# Patient Record
Sex: MALE | Race: WHITE | NOT HISPANIC OR LATINO | Employment: OTHER | URBAN - METROPOLITAN AREA
[De-identification: names, ages, dates, MRNs, and addresses within clinical notes are randomized per-mention and may not be internally consistent; named-entity substitution may affect disease eponyms.]

---

## 2023-11-15 ENCOUNTER — LAB (OUTPATIENT)
Dept: LAB | Facility: HOSPITAL | Age: 28
End: 2023-11-15
Payer: COMMERCIAL

## 2023-11-15 ENCOUNTER — TRANSCRIBE ORDERS (OUTPATIENT)
Dept: LAB | Facility: HOSPITAL | Age: 28
End: 2023-11-15
Payer: COMMERCIAL

## 2023-11-15 DIAGNOSIS — M25.561 RIGHT KNEE PAIN, UNSPECIFIED CHRONICITY: ICD-10-CM

## 2023-11-15 DIAGNOSIS — M25.561 RIGHT KNEE PAIN, UNSPECIFIED CHRONICITY: Primary | ICD-10-CM

## 2023-11-15 LAB
APPEARANCE FLD: ABNORMAL
CHROMATIN AB SERPL-ACNC: 15.8 IU/ML (ref 0–14)
COLOR FLD: YELLOW
CRYSTALS FLD MICRO: NORMAL
LYMPHOCYTES NFR FLD MANUAL: 6 %
METHOD: ABNORMAL
MONOS+MACROS NFR FLD: 13 %
NEUTROPHILS NFR FLD MANUAL: 81 %
NUC CELL # FLD: ABNORMAL /MM3
RBC # FLD AUTO: 2000 /MM3

## 2023-11-15 PROCEDURE — 89060 EXAM SYNOVIAL FLUID CRYSTALS: CPT

## 2023-11-15 PROCEDURE — 87205 SMEAR GRAM STAIN: CPT | Performed by: ORTHOPAEDIC SURGERY

## 2023-11-15 PROCEDURE — 87070 CULTURE OTHR SPECIMN AEROBIC: CPT | Performed by: ORTHOPAEDIC SURGERY

## 2023-11-15 PROCEDURE — 87147 CULTURE TYPE IMMUNOLOGIC: CPT | Performed by: ORTHOPAEDIC SURGERY

## 2023-11-15 PROCEDURE — 86431 RHEUMATOID FACTOR QUANT: CPT

## 2023-11-15 PROCEDURE — 87186 SC STD MICRODIL/AGAR DIL: CPT | Performed by: ORTHOPAEDIC SURGERY

## 2023-11-15 PROCEDURE — 36415 COLL VENOUS BLD VENIPUNCTURE: CPT

## 2023-11-15 PROCEDURE — 87015 SPECIMEN INFECT AGNT CONCNTJ: CPT | Performed by: ORTHOPAEDIC SURGERY

## 2023-11-15 PROCEDURE — 89051 BODY FLUID CELL COUNT: CPT | Performed by: ORTHOPAEDIC SURGERY

## 2023-11-17 LAB
BACTERIA FLD CULT: ABNORMAL
GRAM STN SPEC: ABNORMAL
GRAM STN SPEC: ABNORMAL

## 2023-11-20 ENCOUNTER — LAB (OUTPATIENT)
Dept: LAB | Facility: HOSPITAL | Age: 28
End: 2023-11-20
Payer: COMMERCIAL

## 2023-11-20 ENCOUNTER — TRANSCRIBE ORDERS (OUTPATIENT)
Dept: LAB | Facility: HOSPITAL | Age: 28
End: 2023-11-20
Payer: COMMERCIAL

## 2023-11-20 DIAGNOSIS — M25.562 LEFT KNEE PAIN, UNSPECIFIED CHRONICITY: ICD-10-CM

## 2023-11-20 DIAGNOSIS — M25.461 EFFUSION OF RIGHT KNEE: ICD-10-CM

## 2023-11-20 DIAGNOSIS — M25.562 LEFT KNEE PAIN, UNSPECIFIED CHRONICITY: Primary | ICD-10-CM

## 2023-11-20 DIAGNOSIS — M25.461 EFFUSION OF RIGHT KNEE: Primary | ICD-10-CM

## 2023-11-20 LAB
ALBUMIN SERPL-MCNC: 4 G/DL (ref 3.5–5.2)
ALBUMIN/GLOB SERPL: 1 G/DL
ALP SERPL-CCNC: 149 U/L (ref 39–117)
ALT SERPL W P-5'-P-CCNC: 102 U/L (ref 1–41)
ANION GAP SERPL CALCULATED.3IONS-SCNC: 12.2 MMOL/L (ref 5–15)
APPEARANCE FLD: ABNORMAL
AST SERPL-CCNC: 34 U/L (ref 1–40)
BASOPHILS # BLD AUTO: 0.06 10*3/MM3 (ref 0–0.2)
BASOPHILS NFR BLD AUTO: 0.7 % (ref 0–1.5)
BILIRUB SERPL-MCNC: 0.5 MG/DL (ref 0–1.2)
BUN SERPL-MCNC: 13 MG/DL (ref 6–20)
BUN/CREAT SERPL: 14.1 (ref 7–25)
CALCIUM SPEC-SCNC: 9.8 MG/DL (ref 8.6–10.5)
CHLORIDE SERPL-SCNC: 100 MMOL/L (ref 98–107)
CO2 SERPL-SCNC: 24.8 MMOL/L (ref 22–29)
COLOR FLD: YELLOW
CREAT SERPL-MCNC: 0.92 MG/DL (ref 0.76–1.27)
CRP SERPL-MCNC: 10.42 MG/DL (ref 0–0.5)
CRYSTALS FLD MICRO: NORMAL
DEPRECATED RDW RBC AUTO: 35.8 FL (ref 37–54)
EGFRCR SERPLBLD CKD-EPI 2021: 116.2 ML/MIN/1.73
EOSINOPHIL # BLD AUTO: 0.12 10*3/MM3 (ref 0–0.4)
EOSINOPHIL NFR BLD AUTO: 1.4 % (ref 0.3–6.2)
ERYTHROCYTE [DISTWIDTH] IN BLOOD BY AUTOMATED COUNT: 12.1 % (ref 12.3–15.4)
ERYTHROCYTE [SEDIMENTATION RATE] IN BLOOD: 33 MM/HR (ref 0–15)
GLOBULIN UR ELPH-MCNC: 3.9 GM/DL
GLUCOSE SERPL-MCNC: 108 MG/DL (ref 65–99)
HCT VFR BLD AUTO: 40.7 % (ref 37.5–51)
HGB BLD-MCNC: 13.5 G/DL (ref 13–17.7)
IMM GRANULOCYTES # BLD AUTO: 0.06 10*3/MM3 (ref 0–0.05)
IMM GRANULOCYTES NFR BLD AUTO: 0.7 % (ref 0–0.5)
LYMPHOCYTES # BLD AUTO: 1.45 10*3/MM3 (ref 0.7–3.1)
LYMPHOCYTES NFR BLD AUTO: 16.7 % (ref 19.6–45.3)
LYMPHOCYTES NFR FLD MANUAL: 5 %
MCH RBC QN AUTO: 27.2 PG (ref 26.6–33)
MCHC RBC AUTO-ENTMCNC: 33.2 G/DL (ref 31.5–35.7)
MCV RBC AUTO: 81.9 FL (ref 79–97)
METHOD: ABNORMAL
MONOCYTES # BLD AUTO: 0.94 10*3/MM3 (ref 0.1–0.9)
MONOCYTES NFR BLD AUTO: 10.8 % (ref 5–12)
MONOS+MACROS NFR FLD: 9 %
NEUTROPHILS NFR BLD AUTO: 6.06 10*3/MM3 (ref 1.7–7)
NEUTROPHILS NFR BLD AUTO: 69.7 % (ref 42.7–76)
NEUTROPHILS NFR FLD MANUAL: 86 %
NRBC BLD AUTO-RTO: 0 /100 WBC (ref 0–0.2)
NRBC FLD-RTO: 1 /100 WBCS
NUC CELL # FLD: ABNORMAL /MM3
PLATELET # BLD AUTO: 347 10*3/MM3 (ref 140–450)
PMV BLD AUTO: 9.2 FL (ref 6–12)
POTASSIUM SERPL-SCNC: 4.3 MMOL/L (ref 3.5–5.2)
PROT SERPL-MCNC: 7.9 G/DL (ref 6–8.5)
RBC # BLD AUTO: 4.97 10*6/MM3 (ref 4.14–5.8)
RBC # FLD AUTO: 4000 /MM3
SODIUM SERPL-SCNC: 137 MMOL/L (ref 136–145)
WBC NRBC COR # BLD AUTO: 8.69 10*3/MM3 (ref 3.4–10.8)

## 2023-11-20 PROCEDURE — 85025 COMPLETE CBC W/AUTO DIFF WBC: CPT

## 2023-11-20 PROCEDURE — 87070 CULTURE OTHR SPECIMN AEROBIC: CPT | Performed by: ORTHOPAEDIC SURGERY

## 2023-11-20 PROCEDURE — 86038 ANTINUCLEAR ANTIBODIES: CPT

## 2023-11-20 PROCEDURE — 89060 EXAM SYNOVIAL FLUID CRYSTALS: CPT

## 2023-11-20 PROCEDURE — 86140 C-REACTIVE PROTEIN: CPT

## 2023-11-20 PROCEDURE — 36415 COLL VENOUS BLD VENIPUNCTURE: CPT

## 2023-11-20 PROCEDURE — 80053 COMPREHEN METABOLIC PANEL: CPT

## 2023-11-20 PROCEDURE — 85652 RBC SED RATE AUTOMATED: CPT

## 2023-11-20 PROCEDURE — 89051 BODY FLUID CELL COUNT: CPT | Performed by: ORTHOPAEDIC SURGERY

## 2023-11-20 PROCEDURE — 87205 SMEAR GRAM STAIN: CPT | Performed by: ORTHOPAEDIC SURGERY

## 2023-11-20 RX ORDER — IBUPROFEN 200 MG
200 TABLET ORAL EVERY 6 HOURS PRN
COMMUNITY

## 2023-11-20 NOTE — H&P
HPI  Chief complaint bilateral knee swelling  History present illness: This 28-year-old right-hand-dominant  male works as an  and presents with intermittent swelling of both knees that started about a year ago. It initially started after a long car ride and he has had waxing and waning symptoms ever since. He gets stiffness, soreness, weakness and swelling in the right knee is more affected than the left. Pain with activity is primarily with flexion bending movements. He is use Advil, ice and heat to help control symptoms along with a neoprene sleeve. He has no significant pain with walking. He does have decreased range of motion.  Physical Exam  Right knee:  Skin is normal. Normal alignment.  There is quad atrophy. There is a 3+ effusion. No warmth. No erythema. Normal sensation. Capillary refill is normal.  Range of motion of the knee is 0 to 105 degrees of flexion.  Moderate patella crepitation. The patellofemoral grind test is positive.  Lachman grade 0. Posterior drawer grade 0. Posterior lateral drawer grade 0. Pivot shift grade 0. Valgus grade 0. Varus grade 0.    Left knee:  Skin is normal. Normal alignment.  There is quad atrophy. There is 2+ effusion. No warmth. No erythema. Normal sensation. Capillary refill is normal.  Range of motion of the knee is 0 to 115 degrees of flexion.  Moderate patella crepitation. The patellofemoral grind test is positive.  Lachman grade 0. Posterior drawer grade 0. Posterior lateral drawer grade 0. Pivot shift grade 0. Valgus grade 0. Varus grade 0.  Assessment / Plan  Three-view x-ray bilateral knees shows normal osseous quality and joint space without significant abnormality    Assessment: Patient has recurrent effusions bilateral knees etiology unknown.    1. Effusion of right knee joint  M25.461: Effusion, right knee  XR, KNEE, 3 VIEW    2. Effusion of joint of left knee  M25.462: Effusion, left knee    XR, KNEE, 3 VIEW  Result:  - XRAY KNEE 3  VIEW: Performed  Patient Instructions  Cultures of the right knee did come back positive for MRSA.  We will plan for right knee irrigation and debridement with postop infectious disease consult.  We will also order blood work.

## 2023-11-21 ENCOUNTER — HOSPITAL ENCOUNTER (INPATIENT)
Facility: HOSPITAL | Age: 28
LOS: 1 days | Discharge: HOME | End: 2023-11-22
Attending: ORTHOPAEDIC SURGERY | Admitting: ORTHOPAEDIC SURGERY
Payer: COMMERCIAL

## 2023-11-21 ENCOUNTER — ANESTHESIA EVENT (OUTPATIENT)
Dept: PERIOP | Facility: HOSPITAL | Age: 28
End: 2023-11-21
Payer: COMMERCIAL

## 2023-11-21 ENCOUNTER — ANESTHESIA (OUTPATIENT)
Dept: PERIOP | Facility: HOSPITAL | Age: 28
End: 2023-11-21
Payer: COMMERCIAL

## 2023-11-21 DIAGNOSIS — M00.061 STAPHYLOCOCCAL ARTHRITIS OF RIGHT KNEE: Primary | ICD-10-CM

## 2023-11-21 PROBLEM — M00.9 SEPTIC JOINT OF RIGHT KNEE JOINT: Status: ACTIVE | Noted: 2023-11-21

## 2023-11-21 LAB
ANA SER QL IF: POSITIVE
ANA SPECKLED TITR SER: ABNORMAL {TITER}
Lab: ABNORMAL

## 2023-11-21 PROCEDURE — 25010000002 HYDROMORPHONE 1 MG/ML SOLUTION: Performed by: ANESTHESIOLOGY

## 2023-11-21 PROCEDURE — 0SBC4ZZ EXCISION OF RIGHT KNEE JOINT, PERCUTANEOUS ENDOSCOPIC APPROACH: ICD-10-PCS | Performed by: ORTHOPAEDIC SURGERY

## 2023-11-21 PROCEDURE — 25010000002 FENTANYL CITRATE (PF) 50 MCG/ML SOLUTION: Performed by: ANESTHESIOLOGY

## 2023-11-21 PROCEDURE — 25010000002 ONDANSETRON PER 1 MG: Performed by: ANESTHESIOLOGY

## 2023-11-21 PROCEDURE — 25010000002 PROPOFOL 200 MG/20ML EMULSION: Performed by: ANESTHESIOLOGY

## 2023-11-21 PROCEDURE — 25010000002 MIDAZOLAM PER 1 MG: Performed by: ANESTHESIOLOGY

## 2023-11-21 PROCEDURE — 25010000002 KETOROLAC TROMETHAMINE PER 15 MG: Performed by: ANESTHESIOLOGY

## 2023-11-21 PROCEDURE — 25810000003 SODIUM CHLORIDE 0.9 % SOLUTION: Performed by: ORTHOPAEDIC SURGERY

## 2023-11-21 PROCEDURE — 25010000002 VANCOMYCIN 10 G RECONSTITUTED SOLUTION: Performed by: ORTHOPAEDIC SURGERY

## 2023-11-21 PROCEDURE — 25010000002 DEXAMETHASONE SODIUM PHOSPHATE 20 MG/5ML SOLUTION: Performed by: ANESTHESIOLOGY

## 2023-11-21 PROCEDURE — 0SBD4ZZ EXCISION OF LEFT KNEE JOINT, PERCUTANEOUS ENDOSCOPIC APPROACH: ICD-10-PCS | Performed by: ORTHOPAEDIC SURGERY

## 2023-11-21 PROCEDURE — 25810000003 LACTATED RINGERS PER 1000 ML: Performed by: ANESTHESIOLOGY

## 2023-11-21 RX ORDER — SODIUM CHLORIDE, SODIUM LACTATE, POTASSIUM CHLORIDE, CALCIUM CHLORIDE 600; 310; 30; 20 MG/100ML; MG/100ML; MG/100ML; MG/100ML
9 INJECTION, SOLUTION INTRAVENOUS CONTINUOUS
Status: DISCONTINUED | OUTPATIENT
Start: 2023-11-21 | End: 2023-11-22 | Stop reason: HOSPADM

## 2023-11-21 RX ORDER — FENTANYL CITRATE 50 UG/ML
50 INJECTION, SOLUTION INTRAMUSCULAR; INTRAVENOUS
Status: DISCONTINUED | OUTPATIENT
Start: 2023-11-21 | End: 2023-11-21 | Stop reason: HOSPADM

## 2023-11-21 RX ORDER — FLUMAZENIL 0.1 MG/ML
0.2 INJECTION INTRAVENOUS AS NEEDED
Status: DISCONTINUED | OUTPATIENT
Start: 2023-11-21 | End: 2023-11-21 | Stop reason: HOSPADM

## 2023-11-21 RX ORDER — HYDROMORPHONE HYDROCHLORIDE 1 MG/ML
0.5 INJECTION, SOLUTION INTRAMUSCULAR; INTRAVENOUS; SUBCUTANEOUS
Status: DISCONTINUED | OUTPATIENT
Start: 2023-11-21 | End: 2023-11-22 | Stop reason: HOSPADM

## 2023-11-21 RX ORDER — LIDOCAINE HYDROCHLORIDE 10 MG/ML
0.5 INJECTION, SOLUTION INFILTRATION; PERINEURAL ONCE AS NEEDED
Status: DISCONTINUED | OUTPATIENT
Start: 2023-11-21 | End: 2023-11-21 | Stop reason: HOSPADM

## 2023-11-21 RX ORDER — PROPOFOL 10 MG/ML
INJECTION, EMULSION INTRAVENOUS AS NEEDED
Status: DISCONTINUED | OUTPATIENT
Start: 2023-11-21 | End: 2023-11-21 | Stop reason: SURG

## 2023-11-21 RX ORDER — EPHEDRINE SULFATE 50 MG/ML
5 INJECTION, SOLUTION INTRAVENOUS ONCE AS NEEDED
Status: DISCONTINUED | OUTPATIENT
Start: 2023-11-21 | End: 2023-11-21 | Stop reason: HOSPADM

## 2023-11-21 RX ORDER — ONDANSETRON 2 MG/ML
4 INJECTION INTRAMUSCULAR; INTRAVENOUS EVERY 6 HOURS PRN
Status: DISCONTINUED | OUTPATIENT
Start: 2023-11-21 | End: 2023-11-22 | Stop reason: HOSPADM

## 2023-11-21 RX ORDER — SODIUM CHLORIDE, SODIUM LACTATE, POTASSIUM CHLORIDE, CALCIUM CHLORIDE 600; 310; 30; 20 MG/100ML; MG/100ML; MG/100ML; MG/100ML
INJECTION, SOLUTION INTRAVENOUS CONTINUOUS PRN
Status: DISCONTINUED | OUTPATIENT
Start: 2023-11-21 | End: 2023-11-21 | Stop reason: SURG

## 2023-11-21 RX ORDER — ACETAMINOPHEN 500 MG
1000 TABLET ORAL ONCE
Status: COMPLETED | OUTPATIENT
Start: 2023-11-21 | End: 2023-11-21

## 2023-11-21 RX ORDER — VANCOMYCIN/0.9 % SOD CHLORIDE 1.5G/250ML
1500 PLASTIC BAG, INJECTION (ML) INTRAVENOUS ONCE
Status: COMPLETED | OUTPATIENT
Start: 2023-11-21 | End: 2023-11-21

## 2023-11-21 RX ORDER — PROMETHAZINE HYDROCHLORIDE 25 MG/1
25 TABLET ORAL ONCE AS NEEDED
Status: DISCONTINUED | OUTPATIENT
Start: 2023-11-21 | End: 2023-11-21 | Stop reason: HOSPADM

## 2023-11-21 RX ORDER — FENTANYL CITRATE 50 UG/ML
INJECTION, SOLUTION INTRAMUSCULAR; INTRAVENOUS AS NEEDED
Status: DISCONTINUED | OUTPATIENT
Start: 2023-11-21 | End: 2023-11-21 | Stop reason: SURG

## 2023-11-21 RX ORDER — VANCOMYCIN HYDROCHLORIDE 1 G/20ML
1500 INJECTION, POWDER, LYOPHILIZED, FOR SOLUTION INTRAVENOUS ONCE
Status: DISCONTINUED | OUTPATIENT
Start: 2023-11-21 | End: 2023-11-21

## 2023-11-21 RX ORDER — KETOROLAC TROMETHAMINE 30 MG/ML
INJECTION, SOLUTION INTRAMUSCULAR; INTRAVENOUS AS NEEDED
Status: DISCONTINUED | OUTPATIENT
Start: 2023-11-21 | End: 2023-11-21 | Stop reason: SURG

## 2023-11-21 RX ORDER — DEXMEDETOMIDINE HYDROCHLORIDE 100 UG/ML
INJECTION, SOLUTION INTRAVENOUS AS NEEDED
Status: DISCONTINUED | OUTPATIENT
Start: 2023-11-21 | End: 2023-11-21

## 2023-11-21 RX ORDER — HYDRALAZINE HYDROCHLORIDE 20 MG/ML
5 INJECTION INTRAMUSCULAR; INTRAVENOUS
Status: DISCONTINUED | OUTPATIENT
Start: 2023-11-21 | End: 2023-11-21 | Stop reason: HOSPADM

## 2023-11-21 RX ORDER — PROMETHAZINE HYDROCHLORIDE 25 MG/1
25 SUPPOSITORY RECTAL ONCE AS NEEDED
Status: DISCONTINUED | OUTPATIENT
Start: 2023-11-21 | End: 2023-11-21 | Stop reason: HOSPADM

## 2023-11-21 RX ORDER — DROPERIDOL 2.5 MG/ML
0.62 INJECTION, SOLUTION INTRAMUSCULAR; INTRAVENOUS
Status: DISCONTINUED | OUTPATIENT
Start: 2023-11-21 | End: 2023-11-21 | Stop reason: HOSPADM

## 2023-11-21 RX ORDER — ASPIRIN 81 MG/1
81 TABLET ORAL DAILY
Status: DISCONTINUED | OUTPATIENT
Start: 2023-11-22 | End: 2023-11-22 | Stop reason: HOSPADM

## 2023-11-21 RX ORDER — DEXAMETHASONE SODIUM PHOSPHATE 4 MG/ML
INJECTION, SOLUTION INTRA-ARTICULAR; INTRALESIONAL; INTRAMUSCULAR; INTRAVENOUS; SOFT TISSUE AS NEEDED
Status: DISCONTINUED | OUTPATIENT
Start: 2023-11-21 | End: 2023-11-21 | Stop reason: SURG

## 2023-11-21 RX ORDER — IPRATROPIUM BROMIDE AND ALBUTEROL SULFATE 2.5; .5 MG/3ML; MG/3ML
3 SOLUTION RESPIRATORY (INHALATION) ONCE AS NEEDED
Status: DISCONTINUED | OUTPATIENT
Start: 2023-11-21 | End: 2023-11-21 | Stop reason: HOSPADM

## 2023-11-21 RX ORDER — HYDROCODONE BITARTRATE AND ACETAMINOPHEN 10; 325 MG/1; MG/1
1 TABLET ORAL EVERY 4 HOURS PRN
Status: DISCONTINUED | OUTPATIENT
Start: 2023-11-21 | End: 2023-11-22 | Stop reason: HOSPADM

## 2023-11-21 RX ORDER — MIDAZOLAM HYDROCHLORIDE 1 MG/ML
1 INJECTION INTRAMUSCULAR; INTRAVENOUS
Status: COMPLETED | OUTPATIENT
Start: 2023-11-21 | End: 2023-11-21

## 2023-11-21 RX ORDER — OXYCODONE HYDROCHLORIDE AND ACETAMINOPHEN 5; 325 MG/1; MG/1
1 TABLET ORAL EVERY 4 HOURS PRN
Status: DISCONTINUED | OUTPATIENT
Start: 2023-11-21 | End: 2023-11-22 | Stop reason: HOSPADM

## 2023-11-21 RX ORDER — NALOXONE HCL 0.4 MG/ML
0.4 VIAL (ML) INJECTION
Status: DISCONTINUED | OUTPATIENT
Start: 2023-11-21 | End: 2023-11-22 | Stop reason: HOSPADM

## 2023-11-21 RX ORDER — HYDROMORPHONE HYDROCHLORIDE 1 MG/ML
0.5 INJECTION, SOLUTION INTRAMUSCULAR; INTRAVENOUS; SUBCUTANEOUS
Status: DISCONTINUED | OUTPATIENT
Start: 2023-11-21 | End: 2023-11-21 | Stop reason: HOSPADM

## 2023-11-21 RX ORDER — SODIUM CHLORIDE 0.9 % (FLUSH) 0.9 %
3 SYRINGE (ML) INJECTION EVERY 12 HOURS SCHEDULED
Status: DISCONTINUED | OUTPATIENT
Start: 2023-11-21 | End: 2023-11-21 | Stop reason: HOSPADM

## 2023-11-21 RX ORDER — LIDOCAINE HYDROCHLORIDE 20 MG/ML
INJECTION, SOLUTION EPIDURAL; INFILTRATION; INTRACAUDAL; PERINEURAL AS NEEDED
Status: DISCONTINUED | OUTPATIENT
Start: 2023-11-21 | End: 2023-11-21 | Stop reason: SURG

## 2023-11-21 RX ORDER — LABETALOL HYDROCHLORIDE 5 MG/ML
5 INJECTION, SOLUTION INTRAVENOUS
Status: DISCONTINUED | OUTPATIENT
Start: 2023-11-21 | End: 2023-11-21 | Stop reason: HOSPADM

## 2023-11-21 RX ORDER — ONDANSETRON 2 MG/ML
INJECTION INTRAMUSCULAR; INTRAVENOUS AS NEEDED
Status: DISCONTINUED | OUTPATIENT
Start: 2023-11-21 | End: 2023-11-21 | Stop reason: SURG

## 2023-11-21 RX ORDER — HYDROCODONE BITARTRATE AND ACETAMINOPHEN 5; 325 MG/1; MG/1
1 TABLET ORAL ONCE AS NEEDED
Status: DISCONTINUED | OUTPATIENT
Start: 2023-11-21 | End: 2023-11-21 | Stop reason: HOSPADM

## 2023-11-21 RX ORDER — ONDANSETRON 2 MG/ML
4 INJECTION INTRAMUSCULAR; INTRAVENOUS ONCE AS NEEDED
Status: DISCONTINUED | OUTPATIENT
Start: 2023-11-21 | End: 2023-11-21 | Stop reason: HOSPADM

## 2023-11-21 RX ORDER — BUPIVACAINE HYDROCHLORIDE AND EPINEPHRINE 5; 5 MG/ML; UG/ML
INJECTION, SOLUTION EPIDURAL; INTRACAUDAL; PERINEURAL AS NEEDED
Status: DISCONTINUED | OUTPATIENT
Start: 2023-11-21 | End: 2023-11-21 | Stop reason: HOSPADM

## 2023-11-21 RX ORDER — NALOXONE HCL 0.4 MG/ML
0.2 VIAL (ML) INJECTION AS NEEDED
Status: DISCONTINUED | OUTPATIENT
Start: 2023-11-21 | End: 2023-11-21 | Stop reason: HOSPADM

## 2023-11-21 RX ORDER — DEXMEDETOMIDINE HYDROCHLORIDE 100 UG/ML
INJECTION, SOLUTION INTRAVENOUS AS NEEDED
Status: DISCONTINUED | OUTPATIENT
Start: 2023-11-21 | End: 2023-11-21 | Stop reason: SURG

## 2023-11-21 RX ORDER — OXYCODONE AND ACETAMINOPHEN 7.5; 325 MG/1; MG/1
1 TABLET ORAL EVERY 4 HOURS PRN
Status: DISCONTINUED | OUTPATIENT
Start: 2023-11-21 | End: 2023-11-21 | Stop reason: HOSPADM

## 2023-11-21 RX ORDER — SODIUM CHLORIDE, SODIUM LACTATE, POTASSIUM CHLORIDE, AND CALCIUM CHLORIDE .6; .31; .03; .02 G/100ML; G/100ML; G/100ML; G/100ML
IRRIGANT IRRIGATION AS NEEDED
Status: DISCONTINUED | OUTPATIENT
Start: 2023-11-21 | End: 2023-11-21 | Stop reason: HOSPADM

## 2023-11-21 RX ORDER — VANCOMYCIN/0.9 % SOD CHLORIDE 1.5G/250ML
15 PLASTIC BAG, INJECTION (ML) INTRAVENOUS EVERY 12 HOURS
Status: DISCONTINUED | OUTPATIENT
Start: 2023-11-22 | End: 2023-11-21

## 2023-11-21 RX ORDER — SODIUM CHLORIDE 0.9 % (FLUSH) 0.9 %
3-10 SYRINGE (ML) INJECTION AS NEEDED
Status: DISCONTINUED | OUTPATIENT
Start: 2023-11-21 | End: 2023-11-21 | Stop reason: HOSPADM

## 2023-11-21 RX ORDER — DIPHENHYDRAMINE HYDROCHLORIDE 50 MG/ML
12.5 INJECTION INTRAMUSCULAR; INTRAVENOUS
Status: DISCONTINUED | OUTPATIENT
Start: 2023-11-21 | End: 2023-11-21 | Stop reason: HOSPADM

## 2023-11-21 RX ADMIN — ACETAMINOPHEN 1000 MG: 500 TABLET ORAL at 15:49

## 2023-11-21 RX ADMIN — FENTANYL CITRATE 50 MCG: 50 INJECTION, SOLUTION INTRAMUSCULAR; INTRAVENOUS at 17:17

## 2023-11-21 RX ADMIN — ONDANSETRON 4 MG: 2 INJECTION INTRAMUSCULAR; INTRAVENOUS at 18:10

## 2023-11-21 RX ADMIN — LIDOCAINE HYDROCHLORIDE 100 MG: 20 INJECTION, SOLUTION EPIDURAL; INFILTRATION; INTRACAUDAL; PERINEURAL at 17:11

## 2023-11-21 RX ADMIN — PROPOFOL 50 MG: 10 INJECTION, EMULSION INTRAVENOUS at 17:13

## 2023-11-21 RX ADMIN — DEXAMETHASONE SODIUM PHOSPHATE 10 MG: 4 INJECTION, SOLUTION INTRAMUSCULAR; INTRAVENOUS at 17:25

## 2023-11-21 RX ADMIN — OXYCODONE AND ACETAMINOPHEN 1 TABLET: 7.5; 325 TABLET ORAL at 18:57

## 2023-11-21 RX ADMIN — HYDROMORPHONE HYDROCHLORIDE 0.5 MG: 1 INJECTION, SOLUTION INTRAMUSCULAR; INTRAVENOUS; SUBCUTANEOUS at 17:54

## 2023-11-21 RX ADMIN — SODIUM CHLORIDE, POTASSIUM CHLORIDE, SODIUM LACTATE AND CALCIUM CHLORIDE: 600; 310; 30; 20 INJECTION, SOLUTION INTRAVENOUS at 17:05

## 2023-11-21 RX ADMIN — MIDAZOLAM 1 MG: 1 INJECTION INTRAMUSCULAR; INTRAVENOUS at 16:04

## 2023-11-21 RX ADMIN — DEXMEDETOMIDINE HCL 30 MCG: 100 INJECTION INTRAVENOUS at 17:25

## 2023-11-21 RX ADMIN — KETOROLAC TROMETHAMINE 30 MG: 30 INJECTION, SOLUTION INTRAMUSCULAR at 18:10

## 2023-11-21 RX ADMIN — PROPOFOL 250 MG: 10 INJECTION, EMULSION INTRAVENOUS at 17:11

## 2023-11-21 RX ADMIN — VANCOMYCIN HYDROCHLORIDE 1500 MG: 10 INJECTION, POWDER, LYOPHILIZED, FOR SOLUTION INTRAVENOUS at 15:46

## 2023-11-21 RX ADMIN — MIDAZOLAM 1 MG: 1 INJECTION INTRAMUSCULAR; INTRAVENOUS at 15:54

## 2023-11-21 NOTE — ANESTHESIA PREPROCEDURE EVALUATION
Anesthesia Evaluation     Patient summary reviewed and Nursing notes reviewed   NPO Solid Status: > 6 hours  NPO Liquid Status: > 2 hours           Airway   Mallampati: I  TM distance: >3 FB  Neck ROM: full  Dental - normal exam     Pulmonary    (-) COPD, asthma, not a smoker  Cardiovascular   Exercise tolerance: good (4-7 METS)    (-) past MI, dysrhythmias, angina      Neuro/Psych  (-) seizures, CVA  GI/Hepatic/Renal/Endo    (-) GERD, liver disease, no renal disease, diabetes, no thyroid disorder    Musculoskeletal     Abdominal    Substance History      OB/GYN          Other                    Anesthesia Plan    ASA 1     general       Anesthetic plan, risks, benefits, and alternatives have been provided, discussed and informed consent has been obtained with: patient.    CODE STATUS:

## 2023-11-21 NOTE — ANESTHESIA PROCEDURE NOTES
Airway  Urgency: elective    Date/Time: 11/21/2023 5:14 PM  Airway not difficult    General Information and Staff    Patient location during procedure: OR    Indications and Patient Condition  Indications for airway management: airway protection  MILS maintained throughout  Mask difficulty assessment: 0 - not attempted    Final Airway Details  Final airway type: supraglottic airway      Successful airway: unique  Size 4     Number of attempts at approach: 1  Assessment: lips, teeth, and gum same as pre-op and atraumatic intubation

## 2023-11-22 VITALS
SYSTOLIC BLOOD PRESSURE: 121 MMHG | RESPIRATION RATE: 16 BRPM | WEIGHT: 210.1 LBS | HEART RATE: 80 BPM | BODY MASS INDEX: 29.41 KG/M2 | TEMPERATURE: 97.8 F | HEIGHT: 71 IN | OXYGEN SATURATION: 100 % | DIASTOLIC BLOOD PRESSURE: 71 MMHG

## 2023-11-22 PROCEDURE — 25010000002 VANCOMYCIN 10 G RECONSTITUTED SOLUTION: Performed by: ORTHOPAEDIC SURGERY

## 2023-11-22 PROCEDURE — 25010000002 CEFTRIAXONE PER 250 MG: Performed by: ORTHOPAEDIC SURGERY

## 2023-11-22 PROCEDURE — C1751 CATH, INF, PER/CENT/MIDLINE: HCPCS

## 2023-11-22 PROCEDURE — 87040 BLOOD CULTURE FOR BACTERIA: CPT | Performed by: INTERNAL MEDICINE

## 2023-11-22 PROCEDURE — 02HV33Z INSERTION OF INFUSION DEVICE INTO SUPERIOR VENA CAVA, PERCUTANEOUS APPROACH: ICD-10-PCS | Performed by: ORTHOPAEDIC SURGERY

## 2023-11-22 PROCEDURE — 25810000003 SODIUM CHLORIDE 0.9 % SOLUTION: Performed by: ORTHOPAEDIC SURGERY

## 2023-11-22 RX ORDER — OXYCODONE HYDROCHLORIDE AND ACETAMINOPHEN 5; 325 MG/1; MG/1
1 TABLET ORAL EVERY 4 HOURS PRN
Qty: 28 TABLET | Refills: 0 | Status: SHIPPED | OUTPATIENT
Start: 2023-11-22 | End: 2023-11-28

## 2023-11-22 RX ORDER — SODIUM CHLORIDE 0.9 % (FLUSH) 0.9 %
10 SYRINGE (ML) INJECTION AS NEEDED
Status: DISCONTINUED | OUTPATIENT
Start: 2023-11-22 | End: 2023-11-22 | Stop reason: HOSPADM

## 2023-11-22 RX ORDER — SODIUM CHLORIDE 9 MG/ML
40 INJECTION, SOLUTION INTRAVENOUS AS NEEDED
Status: DISCONTINUED | OUTPATIENT
Start: 2023-11-22 | End: 2023-11-22 | Stop reason: HOSPADM

## 2023-11-22 RX ORDER — ASPIRIN 81 MG/1
81 TABLET ORAL DAILY
Qty: 20 TABLET | Refills: 0 | Status: SHIPPED | OUTPATIENT
Start: 2023-11-23

## 2023-11-22 RX ORDER — SODIUM CHLORIDE 0.9 % (FLUSH) 0.9 %
20 SYRINGE (ML) INJECTION AS NEEDED
Status: DISCONTINUED | OUTPATIENT
Start: 2023-11-22 | End: 2023-11-22 | Stop reason: HOSPADM

## 2023-11-22 RX ORDER — SODIUM CHLORIDE 0.9 % (FLUSH) 0.9 %
10 SYRINGE (ML) INJECTION EVERY 12 HOURS SCHEDULED
Status: DISCONTINUED | OUTPATIENT
Start: 2023-11-22 | End: 2023-11-22 | Stop reason: HOSPADM

## 2023-11-22 RX ADMIN — ASPIRIN 81 MG: 81 TABLET, COATED ORAL at 10:04

## 2023-11-22 RX ADMIN — VANCOMYCIN HYDROCHLORIDE 1250 MG: 10 INJECTION, POWDER, LYOPHILIZED, FOR SOLUTION INTRAVENOUS at 12:07

## 2023-11-22 RX ADMIN — VANCOMYCIN HYDROCHLORIDE 1250 MG: 10 INJECTION, POWDER, LYOPHILIZED, FOR SOLUTION INTRAVENOUS at 00:06

## 2023-11-22 RX ADMIN — OXYCODONE HYDROCHLORIDE AND ACETAMINOPHEN 1 TABLET: 5; 325 TABLET ORAL at 10:05

## 2023-11-22 RX ADMIN — CEFTRIAXONE SODIUM 1000 MG: 1 INJECTION, POWDER, FOR SOLUTION INTRAMUSCULAR; INTRAVENOUS at 00:05

## 2023-11-22 NOTE — DISCHARGE INSTRUCTIONS
Please educate patient to call Dr. Berry at [phone] on once a week basis to go over review system while on IV antibiotic therapy to monitor side effects of antibiotics and effectiveness of treatment.Dr. Gonsales  [address] [facility] [address]  [phone]    Discharge Instructions for Knee Arthroscopy      SPECIFIC POST-OP INSTRUCTIONS:  * FOLLOW UP APPOINTMENT: You will need to call our office [phone] and make a follow up appointment for    5-7 days after your date of surgery. We can see you back sooner if there are any problems or concerns.   * SUTURES: Sutures are taken out 5-7 days post-op. This will be done during your first post-op appointment in our office.   * ICE: Ice helps to decrease both pain and swelling. Ice should be applied to the front and back of the knee 20-25 minutes each hour while awake.   * DRESSING CHANGES: You can change dressing next day after surgery. You can remove tape, white gauze pads and small yellow gauze strips. We ask that you keep the incision clean and dry. Please use water proof Band-Aids to cover incision(s) while showering. These can be purchased at your local pharmacy.  These can be changed daily or as needed. Do not use any ointment on the incision(s).   * SHOWERING: The wound edges typically come together and seal by 3-5 days post-op if there is no drainage. Again, please use waterproof Band-Aids to cover incision(s) while showering. DO NOT SUBMERSE KNEE IN POOL, HOT TUB OR BATH UNTIL AT LEAST 3-4 WEEKS POST-OP (EVEN WITH WATERPROOF BANDAIDS).  * PAIN MEDICINE: You will be given a prescription for oral pain medication prior to discharge from the hospital. Please let us know if you have a sensitivity to certain pain medications prior to discharge. Additional pain medication prescriptions can be written, but must be picked up at either our [place] or [place] office locations. They can NOT be called into your pharmacy.   * ORAL ANTI-INFLAMMATORIES:   You will be  "asked to discontinue ALL oral anti-inflammatories 2 weeks prior to surgery. You can resume these day of surgery - AFTER YOUR PROCEDURE/ONCE YOU ARE HOME.  These can be combined with the oral pain medications safely. DO NOT TAKE ADDITIONAL TYLENOL WITH THE NARCOTIC PAIN MEDICATION (it already has Tylenol in it). If you were not taking an anti-inflammatory pre-op, you can start one of them the first day post-op. It will help decrease pain and swelling. Common medications and dosages are as follows:   Advil/Motrin/Ibuprofen 200mg, 4 pills every 8 hours   Aleve/Naproxen Sodium 220mg, 2 pills every 12 hours  * CRUTCHES/BRACE: You can be weight bearing as tolerated with crutches. Typically people use crutches for 3-7 days after a knee arthroscopy.  * PHYSICAL THERAPY: During your first post-op visit, we will give you a prescription to start physical therapy. This can be done downstairs at LOC  or at a location of your choice. Physical therapy is typically 2-3 times a week for 4 weeks, depending on the procedure, the individual, and his/her progress.   * DRIVING A CAR: Medically/legally we cannot recommend you drive a car while using crutches or while on pain medication.   * RETURNING TO DAILY AND RECREATIONAL ACTIVITIES: For the most part patients can progress to daily activities as tolerated (keeping in mind the restrictions listed above). Initially, we do not want you to \"overdo\" it in an attempt to minimize post-op pain and swelling. Once the swelling is controlled, you can progress with activities as tolerated. Pain and swelling should be your guide to increasing your activity level.   * RETURN TO WORK: The return to work date depends on many factors and is very dependent on the individual. You would most likely be able to return to a sedentary job after your first post-op visit (7-10 days after surgery). We can discuss specific work restrictions based on your job duties during this visit. A more physical job would " obviously require a longer recovery time before return to work.

## 2023-11-22 NOTE — SIGNIFICANT NOTE
"   11/22/23 1724   PICC Single Lumen 11/22/23 Right Basilic   Placement date: If unknown, DO NOT use \"Add Comment\" note/Placement time: If unknown, DO NOT use \"Add Comment\" note: 11/22/23 1723   Hand Hygiene Completed: Yes  Size (Fr): 4  Description (optional): [id number]   exp   12-  Length (cm): 45 cm  Or...   #1 Lumen Status Blood return noted;Capped;Flushed;Normal saline locked   Length ras (cm) 45 cm   Extremity Circumference (cm) 36 cm   Dressing Type Border Dressing;Transparent;Securing device;Antimicrobial dressing/disc   Liquid Adhesive Applied   Dressing Change Due 11/29/23   Indication/Daily Review of Necessity long-term IV access >7 days             3 NEEDLES , 2 GUIDEWIRES AND 1 SCALPEL WERE ACCOUNTED FOR AND DISPOSED OF PROPERLY      PICC IS APPROVED FOR USE  "

## 2023-11-22 NOTE — CONSULTS
CONSULT NOTE    Infectious Diseases - Bo Becerril MD  [facility]       Patient Identification:  Name: Lamont Crowley  Age: 28 y.o.  Sex: male  :  1995  MRN: [id number]             Date of Consultation: 2023      Primary Care Physician: Tr Shaw MD                               Requesting Physician: Dr. Gonsales  Reason for Consultation: Bilateral septic arthritis of native knee with joint fluid culture positive for MRSA from 11/15/2023.    Impression: Patient is a 28-year-old male who is otherwise healthy has been battling with off-and-on swelling and discomfort of the right knee since last year started during a long drive.  In this background few months ago he started noticing left knee started showing intermittent swelling and discomfort.  Up to 3 weeks ago patient was having these episodes in which his both knees at times get swollen and he gets stiffness and pain and with local care such as rest wrapping and elevation his discomfort goes away and is become functional again such as being able to play volleyball pickleball and any kind of activity.  Patient denies any new pain or discomfort such as neck pain back pain hip pain shoulder pain or knee pain.  He does not recall any episodes of fever chills or night sweats until 3 weeks ago when he started having these episodes.  Despite swelling of his knees and night sweats in the last 3 weeks he had preserved appetite.  Patient was eventually seen by Dr. Gonsales and evaluation included joint fluid aspiration which showed finding consistent with inflammatory process with crystal studies negative and right knee joint fluid culture come back positive for MRSA.  Patient is brought in to the hospital on 2023 and underwent arthroscopic washout of bilateral knee.  Patient has been appropriately started on vancomycin and ceftriaxone and infectious disease service is consulted.  Patient does not recall any obvious damage injury or  lesions on his knees or anywhere else and as mentioned above does not recall episodes in which she had unexplained fever or chills.  1-bilateral native knee joint septic arthritis with right knee joint fluid culture positive for MRSA suggestive of transient MRSA bacteremic process in the past with eventual seeding to the joint with symptom taking turn towards worse in the last 3 weeks based on clinical description of his symptoms.  2-status post bilateral arthroscopic knee joint washout on 11/21/2023.    Recommendations/Discussions:  At this juncture to complete the workup I will do blood cultures even though patient has received antibiotic treatment to make sure that he is not bacteremic.  Going forward patient will require exhaustive review of system on periodic basis to identify other areas of secondary seeding due to Staph aureus and specifically need to be question about evolving general sense of ill health, night sweats fever and chills as well as any localizing new pain or discomfort such as neck pain back pain hip pain shoulder pain or knee pain.  Would recommend 4 weeks of IV vancomycin pharmacy to dose to achieve a trough level of 15-20 or area under the curve of 400-600.  I have explained side effects of antibiotic therapy such as nausea vomiting diarrhea rash potential for hepatic and renal dysfunction cytopenias and drug interaction and selection of resistant pathogens as well as yeast and C. difficile infection.  Patient understands that antibiotic therapy is needed and side effect risk need to be understood as benefit of treatment far outweighs the potential risks.  Patient is willing to proceed with antibiotic therapy.  Side effects of long-term IV access such as PICC line or midline discussed with the patient including limitation of activity, blood clots, secondary infection.  Patient understands the need for IV antibiotic therapy and accepts the potential risks that could occur from the PICC line  use.  Patient is educated to call me at [phone] on a weekly basis to go over review system and monitor antibiotic toxicity.  Following orders are written for case management:  Please arrange for 4 weeks of IV vancomycin to be dosed by pharmacy to achieve a trough level of 15-20 or area under the curve of 400-600 with start of the treatment would be 11/22/2023.  Check weekly CBC CMP and CRP and call abnormal results to Dr. Berry at [phone].  Remove midline/PICC line once antibiotics are finished.  Please educate patient to call Dr. Berry at [phone] on a weekly basis to go over review systems and monitor antibiotic toxicity.  Diagnosis: Bilateral knee joint septic arthritis likely due to hematogenous seeding from occult bacteremia.        History of Present Illness:   Patient is a 28-year-old male who is otherwise healthy has been battling with off-and-on swelling and discomfort of the right knee since last year started during a long drive.  In this background few months ago he started noticing left knee started showing intermittent swelling and discomfort.  Up to 3 weeks ago patient was having these episodes in which his both knees at times get swollen and he gets stiffness and pain and with local care such as rest wrapping and elevation his discomfort goes away and is become functional again such as being able to play volleyball pickleball and any kind of activity.  Patient denies any new pain or discomfort such as neck pain back pain hip pain shoulder pain or knee pain.  He does not recall any episodes of fever chills or night sweats until 3 weeks ago when he started having these episodes.  Despite swelling of his knees and night sweats in the last 3 weeks he had preserved appetite.  Patient was eventually seen by Dr. Gonsales and evaluation included joint fluid aspiration which showed finding consistent with inflammatory process with crystal studies negative and right knee joint fluid culture come back  positive for MRSA.  Patient is brought in to the hospital on 11/21/2023 and underwent arthroscopic washout of bilateral knee.  Patient has been appropriately started on vancomycin and ceftriaxone and infectious disease service is consulted.  Patient does not recall any obvious damage injury or lesions on his knees or anywhere else and as mentioned above does not recall episodes in which she had unexplained fever or chills.      Past Medical History:  Past Medical History:   Diagnosis Date    Knee pain      Past Surgical History:  Past Surgical History:   Procedure Laterality Date    HUMERUS FRACTURE SURGERY Bilateral     ELBOWS      Home Meds:  Medications Prior to Admission   Medication Sig Dispense Refill Last Dose    ibuprofen (ADVIL,MOTRIN) 200 MG tablet Take 1 tablet by mouth Every 6 (Six) Hours As Needed for Mild Pain.   Past Week     Current Meds:     Current Facility-Administered Medications:     aspirin EC tablet 81 mg, 81 mg, Oral, Daily, Tr Gonsales MD, 81 mg at 11/22/23 1004    cefTRIAXone (ROCEPHIN) 1,000 mg in sodium chloride 0.9 % 100 mL IVPB-VTB, 1,000 mg, Intravenous, Q24H, Tr Gonsales MD, Last Rate: 200 mL/hr at 11/22/23 0005, 1,000 mg at 11/22/23 0005    HYDROcodone-acetaminophen (NORCO)  MG per tablet 1 tablet, 1 tablet, Oral, Q4H PRN, Tr Gonsales MD    HYDROmorphone (DILAUDID) injection 0.5 mg, 0.5 mg, Intravenous, Q2H PRN **AND** naloxone (NARCAN) injection 0.4 mg, 0.4 mg, Intravenous, Q5 Min PRN, Tr Gonsales MD    lactated ringers infusion, 9 mL/hr, Intravenous, Continuous, Jase Palmer MD    ondansetron (ZOFRAN) injection 4 mg, 4 mg, Intravenous, Q6H PRN, Tr Gonsales MD    oxyCODONE-acetaminophen (PERCOCET) 5-325 MG per tablet 1 tablet, 1 tablet, Oral, Q4H PRN, Tr Gonsales MD, 1 tablet at 11/22/23 1005    Pharmacy to dose vancomycin, , Does not apply, Continuous PRN, Tr Gonsales MD    vancomycin 1250 mg/250 mL 0.9% NS IVPB ([facility]), 1,250 mg,  "Intravenous, Q12H, Tr Gonsales MD, 1,250 mg at 11/22/23 0006  Allergies:  No Known Allergies  Social History:   Social History     Tobacco Use    Smoking status: Never    Smokeless tobacco: Never   Substance Use Topics    Alcohol use: Yes     Comment: SPECIAL OCCASIONS      Family History:  Family History   Problem Relation Age of Onset    Malig Hyperthermia Neg Hx           Review of Systems  See history of present illness and past medical history.        Vitals:   /71 (BP Location: Left arm, Patient Position: Lying)   Pulse 81   Temp 97.8 °F (36.6 °C) (Oral)   Resp 16   Ht 180.3 cm (71\")   Wt 95.3 kg (210 lb 1.6 oz)   SpO2 98%   BMI 29.30 kg/m²   I/O:   Intake/Output Summary (Last 24 hours) at 11/22/2023 1028  Last data filed at 11/22/2023 0835  Gross per 24 hour   Intake 1800 ml   Output 50 ml   Net 1750 ml     Exam:  Patient is examined using the personal protective equipment as per guidelines from infection control for this particular patient as enacted.  Hand washing was performed before and after patient interaction.  General Appearance:    Alert, cooperative, no distress, appears stated age   Head:    Normocephalic, without obvious abnormality, atraumatic   Eyes:    PERRL, conjunctivae/corneas clear, EOM's intact, both eyes   Ears:    Normal external ear canals, both ears   Nose:   Nares normal, septum midline, mucosa normal, no drainage    or sinus tenderness   Throat:   Lips, tongue, gums normal; oral mucosa pink and moist   Neck:   Supple, symmetrical, trachea midline, no adenopathy;     thyroid:  no enlargement/tenderness/nodules; no carotid    bruit or JVD   Back:     Symmetric, no curvature, ROM normal, no CVA tenderness   Lungs:     Clear to auscultation bilaterally, respirations unlabored   Chest Wall:    No tenderness or deformity    Heart:    Regular rate and rhythm, S1 and S2 normal, no murmur, rub   or gallop   Abdomen:     Soft, non-tender, bowel sounds active all four " quadrants,     no masses, no hepatomegaly, no splenomegaly   Extremities: Bilateral knee arthroscopic drainage site dressed with Hemovac drain in place   Pulses:   Pulses palpable in all extremities; symmetric all extremities   Skin:   Skin color normal, Skin is warm and dry,  no rashes or palpable lesions   Neurologic:   CNII-XII intact, motor strength grossly intact, sensation grossly intact to light touch, no focal deficits noted       Data Review:    I reviewed the patient's new clinical results.  Results from last 7 days   Lab Units 11/20/23  0940   WBC 10*3/mm3 8.69   HEMOGLOBIN g/dL 13.5   PLATELETS 10*3/mm3 347     Results from last 7 days   Lab Units 11/20/23  0940   SODIUM mmol/L 137   POTASSIUM mmol/L 4.3   CHLORIDE mmol/L 100   CO2 mmol/L 24.8   BUN mg/dL 13   CREATININE mg/dL 0.92   CALCIUM mg/dL 9.8   GLUCOSE mg/dL 108*     Microbiology Results (last 10 days)       Procedure Component Value - Date/Time    Body Fluid Culture - Body Fluid, Knee, Left [[id number]] Collected: 11/20/23 0940    Lab Status: Preliminary result Specimen: Body Fluid from Knee, Left Updated: 11/22/23 0616     Body Fluid Culture No growth at 2 days     Gram Stain Moderate (3+) WBCs per low power field      No organisms seen    Body Fluid Culture - Body Fluid, Knee, Right [[id number]]  (Abnormal)  (Susceptibility) Collected: 11/15/23 1108    Lab Status: Final result Specimen: Body Fluid from Knee, Right Updated: 11/17/23 0756     Body Fluid Culture Scant growth (1+) Staphylococcus aureus, MRSA     Comment:   Methicillin resistant Staphylococcus aureus, Patient may be an isolation risk.        Gram Stain WBCs seen      No organisms seen    Susceptibility        Staphylococcus aureus, MRSA      BALBIR      Gentamicin Susceptible      Oxacillin Resistant      Rifampin Susceptible      Vancomycin Susceptible                       Susceptibility Comments       Staphylococcus aureus, MRSA    This isolate does not demonstrate inducible  clindamycin resistance in vitro.                       No radiology results for the last 30 days.      Assessment:  Active Hospital Problems    Diagnosis  POA    **Septic joint of right knee joint [M00.9]  Yes      Resolved Hospital Problems   No resolved problems to display.         Plan:  See above  Bo Berry MD   11/22/2023  10:28 EST    Parts of this note may be an electronic transcription/translation of spoken language to printed text using the Dragon dictation system.

## 2023-11-22 NOTE — PLAN OF CARE
Problem: Adult Inpatient Plan of Care  Goal: Plan of Care Review  Outcome: Ongoing, Progressing  Flowsheets (Taken 11/22/2023 0528)  Progress: no change  Plan of Care Reviewed With:   patient   spouse  Outcome Evaluation: po day 1, A&Ox4, NATALIE knee arthro with I&D, contact isolation r/t MRSA, ID consult called in, spouse at bedside, pt receving IV abx, plan is to dc today per pt expectation, pt voiding spontaneously, cont B&B, pt ambulating to bathroom and back to bed and does well with walker and gait belt, pt on RA and sats are WNL, VS wnl,  Goal: Patient-Specific Goal (Individualized)  Outcome: Ongoing, Progressing  Goal: Absence of Hospital-Acquired Illness or Injury  Outcome: Ongoing, Progressing  Intervention: Identify and Manage Fall Risk  Recent Flowsheet Documentation  Taken 11/22/2023 0400 by Mary Ellen Sheth RN  Safety Promotion/Fall Prevention: safety round/check completed  Taken 11/22/2023 0200 by Mary Ellen Sheth RN  Safety Promotion/Fall Prevention: safety round/check completed  Taken 11/22/2023 0000 by Mary Ellen Sheth RN  Safety Promotion/Fall Prevention: safety round/check completed  Taken 11/21/2023 2200 by Mary Ellen Sheth RN  Safety Promotion/Fall Prevention: safety round/check completed  Intervention: Prevent Skin Injury  Recent Flowsheet Documentation  Taken 11/22/2023 0400 by Mary Ellen Sheth RN  Body Position: position changed independently  Taken 11/22/2023 0200 by Mary Ellen Sheth RN  Body Position: position changed independently  Taken 11/22/2023 0000 by Mary Ellen Sheth RN  Body Position: position changed independently  Taken 11/21/2023 2200 by Mary Ellen Sheth RN  Body Position: position changed independently  Intervention: Prevent and Manage VTE (Venous Thromboembolism) Risk  Recent Flowsheet Documentation  Taken 11/22/2023 0000 by Mary Ellen Sheth RN  Activity Management:   ambulated in room   ambulated to bathroom   back to bed    up ad vita  VTE Prevention/Management:   bilateral   sequential compression devices on  Range of Motion: active ROM (range of motion) encouraged  Intervention: Prevent Infection  Recent Flowsheet Documentation  Taken 11/22/2023 0400 by Mary Ellen Sheth RN  Infection Prevention:   hand hygiene promoted   rest/sleep promoted  Taken 11/22/2023 0200 by Mary Ellen Sheth RN  Infection Prevention:   rest/sleep promoted   hand hygiene promoted  Taken 11/22/2023 0000 by Mary Ellen Sheth RN  Infection Prevention:   hand hygiene promoted   rest/sleep promoted  Taken 11/21/2023 2200 by Mary Ellen Sheth RN  Infection Prevention:   rest/sleep promoted   hand hygiene promoted  Goal: Optimal Comfort and Wellbeing  Outcome: Ongoing, Progressing  Intervention: Monitor Pain and Promote Comfort  Recent Flowsheet Documentation  Taken 11/22/2023 0000 by Mary Ellen Sheth RN  Pain Management Interventions:   medication offered but refused   relaxation techniques promoted  Intervention: Provide Person-Centered Care  Recent Flowsheet Documentation  Taken 11/22/2023 0000 by Mary Ellen Sheth RN  Trust Relationship/Rapport: care explained  Goal: Readiness for Transition of Care  Outcome: Ongoing, Progressing     Problem: Pain Acute  Goal: Acceptable Pain Control and Functional Ability  Outcome: Ongoing, Progressing  Intervention: Prevent or Manage Pain  Recent Flowsheet Documentation  Taken 11/22/2023 0400 by Mary Ellen Sheth RN  Medication Review/Management: medications reviewed  Taken 11/22/2023 0200 by Mary Ellen Sheth RN  Medication Review/Management: medications reviewed  Taken 11/22/2023 0000 by Mary Ellen Sheth RN  Medication Review/Management: medications reviewed  Taken 11/21/2023 2200 by Mary Ellen Sheth RN  Medication Review/Management: medications reviewed  Intervention: Develop Pain Management Plan  Recent Flowsheet Documentation  Taken 11/22/2023 0000 by Lexi Mock  JAYCOB Hyde  Pain Management Interventions:   medication offered but refused   relaxation techniques promoted  Intervention: Optimize Psychosocial Wellbeing  Recent Flowsheet Documentation  Taken 11/22/2023 0000 by Mary Ellen Sheth RN  Diversional Activities: television     Problem: Adjustment to Surgery (Knee Arthroplasty)  Goal: Optimal Coping  Outcome: Ongoing, Progressing     Problem: Bleeding (Knee Arthroplasty)  Goal: Absence of Bleeding  Outcome: Ongoing, Progressing     Problem: Bowel Motility Impaired (Knee Arthroplasty)  Goal: Effective Bowel Elimination  Outcome: Ongoing, Progressing     Problem: Fluid and Electrolyte Imbalance (Knee Arthroplasty)  Goal: Fluid and Electrolyte Balance  Outcome: Ongoing, Progressing     Problem: Functional Ability Impaired (Knee Arthroplasty)  Goal: Optimal Functional Ability  Outcome: Ongoing, Progressing  Intervention: Promote Optimal Functional Status  Recent Flowsheet Documentation  Taken 11/22/2023 0000 by Mary Ellen Sheth RN  Activity Management:   ambulated in room   ambulated to bathroom   back to bed   up ad vita     Problem: Infection (Knee Arthroplasty)  Goal: Absence of Infection Signs and Symptoms  Outcome: Ongoing, Progressing  Intervention: Prevent or Manage Infection  Recent Flowsheet Documentation  Taken 11/22/2023 0400 by Mary Ellen Sheth RN  Infection Prevention:   hand hygiene promoted   rest/sleep promoted  Taken 11/22/2023 0200 by Mary Ellen Sheth RN  Infection Prevention:   rest/sleep promoted   hand hygiene promoted  Taken 11/22/2023 0000 by Mary Ellen Sheth RN  Infection Prevention:   hand hygiene promoted   rest/sleep promoted  Taken 11/21/2023 2200 by Mary Ellen Sheth RN  Infection Prevention:   rest/sleep promoted   hand hygiene promoted     Problem: Neurovascular Compromise (Knee Arthroplasty)  Goal: Intact Neurovascular Status  Outcome: Ongoing, Progressing     Problem: Ongoing Anesthesia Effects (Knee  Arthroplasty)  Goal: Anesthesia/Sedation Recovery  Outcome: Ongoing, Progressing  Intervention: Optimize Anesthesia Recovery  Recent Flowsheet Documentation  Taken 11/22/2023 0400 by Mary Ellen Sheth RN  Safety Promotion/Fall Prevention: safety round/check completed  Taken 11/22/2023 0200 by Mary Ellen Sheth RN  Safety Promotion/Fall Prevention: safety round/check completed  Taken 11/22/2023 0000 by Mary Ellen Sheth RN  Safety Promotion/Fall Prevention: safety round/check completed  Taken 11/21/2023 2200 by Mary Ellen Sheth RN  Safety Promotion/Fall Prevention: safety round/check completed     Problem: Pain (Knee Arthroplasty)  Goal: Acceptable Pain Control  Outcome: Ongoing, Progressing  Intervention: Prevent or Manage Pain  Recent Flowsheet Documentation  Taken 11/22/2023 0000 by Mary Ellen Sheth RN  Pain Management Interventions:   medication offered but refused   relaxation techniques promoted  Diversional Activities: television     Problem: Postoperative Nausea and Vomiting (Knee Arthroplasty)  Goal: Nausea and Vomiting Relief  Outcome: Ongoing, Progressing     Problem: Postoperative Urinary Retention (Knee Arthroplasty)  Goal: Effective Urinary Elimination  Outcome: Ongoing, Progressing     Problem: Respiratory Compromise (Knee Arthroplasty)  Goal: Effective Oxygenation and Ventilation  Outcome: Ongoing, Progressing  Intervention: Optimize Oxygenation and Ventilation  Recent Flowsheet Documentation  Taken 11/22/2023 0400 by Mary Ellen Sheth RN  Head of Bed (HOB) Positioning: HOB elevated  Taken 11/22/2023 0200 by Mary Ellen Sheth RN  Head of Bed (HOB) Positioning: HOB elevated  Taken 11/22/2023 0000 by Mary Ellen Sheth RN  Head of Bed (HOB) Positioning: HOB elevated  Taken 11/21/2023 2200 by Mary Ellen Sheth RN  Head of Bed (HOB) Positioning: HOB elevated     Problem: Fall Injury Risk  Goal: Absence of Fall and Fall-Related Injury  Outcome: Ongoing,  Progressing  Intervention: Identify and Manage Contributors  Recent Flowsheet Documentation  Taken 11/22/2023 0400 by Mary Ellen Sheth, RN  Medication Review/Management: medications reviewed  Taken 11/22/2023 0200 by Mary Ellen Sheth, RN  Medication Review/Management: medications reviewed  Taken 11/22/2023 0000 by Mary Ellen Sheth, RN  Medication Review/Management: medications reviewed  Taken 11/21/2023 2200 by Mary Ellen Sheth RN  Medication Review/Management: medications reviewed  Intervention: Promote Injury-Free Environment  Recent Flowsheet Documentation  Taken 11/22/2023 0400 by Mary Ellen Sheth, RN  Safety Promotion/Fall Prevention: safety round/check completed  Taken 11/22/2023 0200 by Mary Ellen Sheth RN  Safety Promotion/Fall Prevention: safety round/check completed  Taken 11/22/2023 0000 by Mary Ellen Sheth RN  Safety Promotion/Fall Prevention: safety round/check completed  Taken 11/21/2023 2200 by Mary Ellen Sheth RN  Safety Promotion/Fall Prevention: safety round/check completed   Goal Outcome Evaluation:  Plan of Care Reviewed With: patient, spouse        Progress: no change  Outcome Evaluation: po day 1, A&Ox4, NATALIE knee arthro with I&D, contact isolation r/t MRSA, ID consult called in, spouse at bedside, pt receving IV abx, plan is to dc today per pt expectation, pt voiding spontaneously, cont B&B, pt ambulating to bathroom and back to bed and does well with walker and gait belt, pt on RA and sats are WNL, VS wnl,

## 2023-11-22 NOTE — PROGRESS NOTES
Orthopedic Progress Note  Tr Gonsales MD  [phone]      Subjective     Post-Operative Day: 1 post-bilateral arthroscopic knee washouts for spontaneous MRSA infections  Systemic or Specific Complaints: No Complaints he has been up walking and drains are working appropriately    Objective     Vital signs in last 24 hours:  Temp:  [97.6 °F (36.4 °C)-98.3 °F (36.8 °C)] 97.8 °F (36.6 °C)  Heart Rate:  [65-83] 80  Resp:  [15-16] 16  BP: (120-142)/(61-95) 121/71    General: alert, appears stated age, and cooperative   Neurovascular: Normal dorsi plantarflexion bilateral lower extremities at the ankle level.     Wound: Both knees are dressed with Ace wrap supplied and drains in place.   Range of Motion: Decreased bilateral knees     Data Review  CBC:  Results from last 7 days   Lab Units 11/20/23  0940   WBC 10*3/mm3 8.69   RBC 10*6/mm3 4.97   HEMOGLOBIN g/dL 13.5   HEMATOCRIT % 40.7   PLATELETS 10*3/mm3 347       Assessment & Plan     Bilateral MRSA infections of the knees spontaneous.  He is on IV vancomycin and he will be dosed appropriately.  PICC line will be placed.  ID consult appreciated.  Expect likely 4-week treatment plan.  I will see him back in a week.    Pain Relief: some relief    Continues current post-op course    Activity: out of bed and ambulate     LOS: 1 day     Tr Gonsales MD    Date: 11/22/2023  Time: 15:50 EST

## 2023-11-22 NOTE — PROGRESS NOTES
"[facility] Clinical Pharmacy Services: Vancomycin Monitoring Note    Lamont Crowley is a 28 y.o. male who is on day 2/3 of pharmacy to dose vancomycin for Bone and/or Joint Infection.    Previous Vancomycin Dose:   1250 mg IV every  12  hours  Updated Cultures and Sensitivities: 11/15 Right knee fluid MRSA scant growth                                                               11/20 BF C: ng x 2 days      Vitals/Labs  Ht: 180.3 cm (71\"); Wt: 95.3 kg (210 lb 1.6 oz)   Temp Readings from Last 1 Encounters:   11/22/23 97.8 °F (36.6 °C) (Oral)     Estimated Creatinine Clearance: 140.8 mL/min (by C-G formula based on SCr of 0.92 mg/dL).        Results from last 7 days   Lab Units 11/20/23  0940   CREATININE mg/dL 0.92   WBC 10*3/mm3 8.69     Assessment/Plan    Current Vancomycin Dose: 1250 mg IV every  12  hours; provides a predicted  mg/L.hr   Next Level Date and Time: Vanc Trough on 11/23 at 1130  We will continue to monitor patient changes and renal function     Thank you for involving pharmacy in this patient's care. Please contact pharmacy with any questions or concerns.       Nubia Roland, [facility]  Clinical Pharmacist          " The patient is a 27y Male complaining of see chief complaint quote.

## 2023-11-22 NOTE — PROGRESS NOTES
"[facility] Clinical Pharmacy Services: Vancomycin Pharmacokinetic Initial Consult Note    Lamont Crowley is a 28 y.o. male who is on day 1 of pharmacy to dose vancomycin.    Indication: Bone and/or Joint Infection  Consulting Provider: Dr. Gonsales  Planned Duration of Therapy: 3 days  Loading Dose Ordered or Given: 1500 mg on 11/21 at 1546  Culture/Source:   11/15 Right knee fluid MRSA scant growth  Target: -600 mg/L.hr   Other Antimicrobials: ceftriaxone 1 g iv every 24 hours    Vitals/Labs  Ht: 180.3 cm (71\"); Wt: 95.3 kg (210 lb 1.6 oz)  Temp Readings from Last 1 Encounters:   11/21/23 97.6 °F (36.4 °C) (Oral)    Estimated Creatinine Clearance: 140.8 mL/min (by C-G formula based on SCr of 0.92 mg/dL).     Results from last 7 days   Lab Units 11/20/23  0940   CREATININE mg/dL 0.92   WBC 10*3/mm3 8.69     Assessment/Plan:    Vancomycin Dose:   1250 mg IV every  12  hours  Predictive AUC level for the dose ordered is 460 mg/L.hr, which is within the target of 400-600 mg/L.hr  Vanc Trough has been ordered for 11/23 at 1130     Pharmacy will follow patient's kidney function and will adjust doses and obtain levels as necessary. Thank you for involving pharmacy in this patient's care. Please contact pharmacy with any questions or concerns.                           Otilio Khan III, [facility]  Clinical Pharmacist    "

## 2023-11-22 NOTE — DISCHARGE PLACEMENT REQUEST
"Choco Benoit (28 y.o. Male)       Date of Birth   1995    Social Security Number       Address   [address] [place] [address]    Home Phone   [phone]    MRN   [id number]       [employer]   [employer]    Marital Status                               Admission Date   11/21/23    Admission Type   Elective    Admitting Provider   Tr Gonsales MD    Attending Provider   Tr Gonsales MD    Department, Room/Bed   [address], P781/1       Discharge Date       Discharge Disposition       Discharge Destination                                 Attending Provider: Tr Gonsales MD    Allergies: No Known Allergies    Isolation: Contact   Infection: MRSA (11/16/23)   Code Status: CPR    Ht: 180.3 cm (71\")   Wt: 95.3 kg (210 lb 1.6 oz)    Admission Cmt: None   Principal Problem: Septic joint of right knee joint [M00.9]                   Active Insurance as of 11/21/2023       Primary Coverage       Payor Plan Insurance Group Employer/Plan Group    AN[employer] BLUE CROSS [facility] BLUE CROSS BLUE SHIELD PPO [id number]       Payor Plan Address Payor Plan Phone Number Payor Plan Fax Number Effective Dates    [address] [phone]  4/1/2023 - None Entered    [facility] [id number]         Subscriber Name Subscriber Birth Date Member ID       CHOCO BENOIT 1995 [id number]                     Emergency Contacts        (Rel.) Home Phone Work Phone Mobile Phone    KEYA BENOIT (Spouse) [phone] -- [phone]              "

## 2023-11-22 NOTE — OP NOTE
.KNEE ARTHROSCOPY  Procedure Note    Lamont Crowley  11/21/2023    Pre-op Diagnosis:   Septic arthritis bilateral knees    Post-op diagnosis:  Post-Op Diagnosis Codes:  Septic arthritis bilateral knees    Procedure(s):  BILATERAL KNEE ARTHROSCOPY WITH IRRIGATION AND DEBRIDEMENT    Surgeon(s):  Tr Gonsales MD    Anesthesia: General  Anesthesiologist: Jase Palmer MD    Staff:   Circulator: Alyssa Lloyd RN; Jesusita Burnham RN; Mirza Montejo RN  Scrub Person: Michael Carmen  Vendor Representative: Obi Meeks    Estimated Blood Loss: minimal    Specimens: * No orders in the log *      Findings: See Dictation    Complications: None    Procedure: The patient was taken to the operative suite.  After adequate anesthesia was established the right lower extremity was prepped and draped in the usual fashion.  Simultaneously the left lower extremity was also prepped and draped in the usual fashion.  The right leg was elevated, and tourniquet inflated to 250 mmHg.  An inferior lateral portal was made and the arthroscope was introduced into the knee.  Under direct visualization an inferior medial portal was made and diagnostic arthroscopy commenced with the following findings.  Serial inflammatory fluid was encountered.  There was synovial inflammation noted and chondromalacia patella.  Overall the articular cartilage on the medial and lateral compartments and trochlea were generally intact.  Both the medial and lateral menisci were intact.  The anterior and posterior cruciate ligaments were intact.  Synovitic reactive tissues were noted and he had a preoperative diagnosis of MRSA infection vigorous irrigation debridement and suctioning was carried out and dilute Betadine was also introduced into the knee joint.  Once this was complete a drain was placed through the inferior medial portal and out the superior lateral thigh.  Once the drain was introduced it was sutured into place.  After vigorous irrigation  with multiple liters of fluid the arthroscopic instruments were removed and the portals closed with 4-0 nylon.  At the completion of the right knee arthroscopy after tourniquet was released the left knee was elevated and the tourniquet was inflated to 250 mmHg.  An inferolateral portal was made and the arthroscope was introduced into the knee.  Under direct visualization an inferomedial portal was made and diagnostic arthroscopy commenced.  0 inflammatory fluid was encountered along with synovitic reactive tissues and fibrinous material within the knee joint.  This was vigorously irrigated suction and debrided systematically through each compartment of the knee joint.  There was an anterior horn lateral meniscus tear and partial lateral meniscectomy was performed.  The ACL PCL and medial compartment tissues were intact.  After a vigorous and systematic debridement and washout dilute Betadine was introduced into the knee and further washed out.  I then placed a drain similar to the contralateral knee and sutured into place.  The portals were closed with 4-0 nylon after the instruments had been removed.  A sterile compression dressing was applied and the tourniquet was released.    The portal sites were injected with half percent Marcaine with epinephrine prior to dressing placement.  A sterile compression dressing was applied and tourniquet was released.  Patient was awoken and taken to the postanesthetic recovery unit in good condition.      Tr Gonsales MD     Date: 11/21/2023  Time: 19:05 EST

## 2023-11-22 NOTE — PROGRESS NOTES
Continued Stay Note  [facility]     Patient Name: Lamont Crowley  MRN: [id number]  Today's Date: 11/22/2023    Admit Date: 11/21/2023        Discharge Plan       Row Name 11/22/23 1654       Plan    Final Discharge Disposition Code 01 - home or self-care    Final Note Spoke with pt and wife, pt agreeable to use [facility] IV infusion. Referral given to to Adelita with [facility] who states they can accept. Patient has been taught at bedside and [facility] and d/c orders placed, no other needs identified.                   Discharge Codes    No documentation.                 Expected Discharge Date and Time       Expected Discharge Date Expected Discharge Time    Nov 22, 2023               Daly Oneil RN

## 2023-11-24 NOTE — PAYOR COMM NOTE
"Choco Benoit (28 y.o. Male)      PATIENT DISCHARGED 11/22/23:  REF#  [id number]      DEPT: [phone],  [phone]  PAULETTE COLLINS RN,[facility]: NPI [id number] TIN# [id number]        Date of Birth   1995    Social Security Number       Address   [address] [place] [address]    Home Phone   [phone]    MRN   [id number]       Worship   Ep[employer]    Marital Status                               Admission Date   11/21/23    Admission Type   Elective    Admitting Provider   Tr Gonsales MD    Attending Provider       Department, Room/Bed   [address], P781/1       Discharge Date   11/22/2023    Discharge Disposition   Home or Self Care    Discharge Destination                                 Attending Provider: (none)   Allergies: No Known Allergies    Isolation: None   Infection: MRSA (11/16/23)   Code Status: Prior    Ht: 180.3 cm (71\")   Wt: 95.3 kg (210 lb 1.6 oz)    Admission Cmt: None   Principal Problem: Septic joint of right knee joint [M00.9]                   Active Insurance as of 11/21/2023       Primary Coverage       Payor Plan Insurance Group Employer/Plan Group    ANTHEM BLUE CROSS ANTH BLUE CROSS BLUE SHIELD PPO [id number]       Payor Plan Address Payor Plan Phone Number Payor Plan Fax Number Effective Dates    [address] [phone]  4/1/2023 - None Entered    [place] [address]         Subscriber Name Subscriber Birth Date Member ID       CHOCO BENOIT 1995 [id number]                     Emergency Contacts        (Rel.) Home Phone Work Phone Mobile Phone    KEYA BENOIT (Spouse) [phone] -- [phone]                 Operative/Procedure Notes (all)        Tr Gonsales MD at 11/21/23 1736  Version 1 of 1         .KNEE ARTHROSCOPY  Procedure Note    Choco Benoit  11/21/2023    Pre-op Diagnosis:   Septic arthritis bilateral knees    Post-op diagnosis:  Post-Op " Diagnosis Codes:  Septic arthritis bilateral knees    Procedure(s):  BILATERAL KNEE ARTHROSCOPY WITH IRRIGATION AND DEBRIDEMENT    Surgeon(s):  Tr Gonsales MD    Anesthesia: General  Anesthesiologist: Jase Palmer MD    Staff:   Circulator: Alyssa Lloyd RN; Jesusita Burnham RN; Mirza Montejo RN  Scrub Person: Tony Carmencolm  Vendor Representative: Obi Meeks    Estimated Blood Loss: minimal    Specimens: * No orders in the log *      Findings: See Dictation    Complications: None    Procedure: The patient was taken to the operative suite.  After adequate anesthesia was established the right lower extremity was prepped and draped in the usual fashion.  Simultaneously the left lower extremity was also prepped and draped in the usual fashion.  The right leg was elevated, and tourniquet inflated to 250 mmHg.  An inferior lateral portal was made and the arthroscope was introduced into the knee.  Under direct visualization an inferior medial portal was made and diagnostic arthroscopy commenced with the following findings.  Serial inflammatory fluid was encountered.  There was synovial inflammation noted and chondromalacia patella.  Overall the articular cartilage on the medial and lateral compartments and trochlea were generally intact.  Both the medial and lateral menisci were intact.  The anterior and posterior cruciate ligaments were intact.  Synovitic reactive tissues were noted and he had a preoperative diagnosis of MRSA infection vigorous irrigation debridement and suctioning was carried out and dilute Betadine was also introduced into the knee joint.  Once this was complete a drain was placed through the inferior medial portal and out the superior lateral thigh.  Once the drain was introduced it was sutured into place.  After vigorous irrigation with multiple liters of fluid the arthroscopic instruments were removed and the portals closed with 4-0 nylon.  At the completion of the right knee  arthroscopy after tourniquet was released the left knee was elevated and the tourniquet was inflated to 250 mmHg.  An inferolateral portal was made and the arthroscope was introduced into the knee.  Under direct visualization an inferomedial portal was made and diagnostic arthroscopy commenced.  0 inflammatory fluid was encountered along with synovitic reactive tissues and fibrinous material within the knee joint.  This was vigorously irrigated suction and debrided systematically through each compartment of the knee joint.  There was an anterior horn lateral meniscus tear and partial lateral meniscectomy was performed.  The ACL PCL and medial compartment tissues were intact.  After a vigorous and systematic debridement and washout dilute Betadine was introduced into the knee and further washed out.  I then placed a drain similar to the contralateral knee and sutured into place.  The portals were closed with 4-0 nylon after the instruments had been removed.  A sterile compression dressing was applied and the tourniquet was released.    The portal sites were injected with half percent Marcaine with epinephrine prior to dressing placement.  A sterile compression dressing was applied and tourniquet was released.  Patient was awoken and taken to the postanesthetic recovery unit in good condition.      Tr Gonsales MD     Date: 11/21/2023  Time: 19:05 EST    Electronically signed by Tr Gonsales MD at 11/21/23 1913       Discharge Summary    No notes of this type exist for this encounter.       Discharge Order (From admission, onward)       Start     Ordered    11/22/23 1557  Discharge patient  Once        Expected Discharge Date: 11/22/23   Expected Discharge Time: Evening   Discharge Disposition: Home or Self Care   Physician of Record for Attribution - Please select from Treatment Team: TR GONSALES [[id number]]   Review needed by CMO to determine Physician of Record: No      Question Answer Comment   Physician of  Record for Attribution - Please select from Treatment Team PHUC CROOKS    Review needed by CMO to determine Physician of Record No        11/22/23 1558                        Daly Oneil, RN     Case Management     Progress Notes      Signed     Date of Service: 11/22/23 1700  Creation Time: 11/22/23 1700     Signed         Continued Stay Note  [facility]     Patient Name: Lamont Crowley              MRN: [id number]  Today's Date: 11/22/2023              Admit Date: 11/21/2023       Discharge Plan         Row Name 11/22/23 1654           Plan     Final Discharge Disposition Code 01 - home or self-care     Final Note Spoke with pt and wife, pt agreeable to use [facility] IV infusion. Referral given to to Adelita with [facility] who states they can accept. Patient has been taught at bedside and per Adelita meds will be delivered to patient home [facility]. PICC placed and d/c orders placed, no other needs identified.                         Discharge Codes    No documentation.                      Expected Discharge Date and Time         Expected Discharge Date Expected Discharge Time     Nov 22, 2023                     Daly Oneil RN

## 2023-11-24 NOTE — DISCHARGE SUMMARY
Orthopedic Discharge Summary      Patient: Lamont Crowley      YOB: 1995    Medical Record Number: [id number]    Attending Physician: No att. providers found  Consulting Physician(s):   Date of Admission: 11/21/2023  1:49 PM  Date of Discharge: 11/22/2023  Diagnosis: Bilateral septic knees with    He remains to do so.  To go do some      Bilateral arthroscopic irrigation and debridements of the knees.    Allergies: No Known Allergies    Current Medications:     Discharge Medications        New Medications        Instructions Start Date   Aspirin Low Dose 81 MG EC tablet  Generic drug: aspirin  Notes to patient: 11/23/23   81 mg, Oral, Daily      oxyCODONE-acetaminophen 5-325 MG per tablet  Commonly known as: PERCOCET  Notes to patient: 11/22/23 NEXT DOSE ANYTIME   1 tablet, Oral, Every 4 Hours PRN             Continue These Medications        Instructions Start Date   ibuprofen 200 MG tablet  Commonly known as: ADVIL,MOTRIN   200 mg, Oral, Every 6 Hours PRN               Past Medical History:   Diagnosis Date    Knee pain      Past Surgical History:   Procedure Laterality Date    HUMERUS FRACTURE SURGERY Bilateral     ELBOWS    KNEE ARTHROSCOPY Bilateral 11/21/2023    Procedure: BILATERAL KNEE ARTHROSCOPY WITH IRRIGATION AND DEBRIDEMENT;  Surgeon: Tr Gonsales MD;  [facility]: [facility] [place] [facility];  Service: Orthopedics;  Laterality: Bilateral;     Social History     Occupational History    Not on file   Tobacco Use    Smoking status: Never    Smokeless tobacco: Never   Vaping Use    Vaping Use: Never used   Substance and Sexual Activity    Alcohol use: Yes     Comment: SPECIAL OCCASIONS    Drug use: Never    Sexual activity: Yes     Partners: Female     Comment: one partner       Social History     Social History Narrative    Not on file     Family History   Problem Relation Age of Onset    Malig Hyperthermia Neg Hx        Physical Exam: 28 y.o. male  General Appearance:    Alert, cooperative, in  no acute distress                      Vitals:    11/22/23 0512 11/22/23 1000 11/22/23 1358 11/22/23 1458   BP: 122/72 124/71 128/61 121/71   BP Location: Left arm Left arm Left arm Left arm   Patient Position: Lying Lying Lying Lying   Pulse: 74 81 74 80   Resp: 16 16 16    Temp: 97.8 °F (36.6 °C) 97.8 °F (36.6 °C) 98.1 °F (36.7 °C) 97.8 °F (36.6 °C)   TempSrc: Oral Oral Oral Oral   SpO2: 97% 98% 100%    Weight:       Height:                                                        Extremities:   Incision intact without signs or symptoms of infection.               Neurovascular status remains intact to operative extremity.      Moves all extremities well, no edema, no cyanosis, no              redness   Pulses:   Pulses palpable and equal bilaterally   Skin:   No bleeding, bruising or rash   Lymph nodes:   No palpable adenopathy   Neurologic:   Cranial nerves 2 - 12 grossly intact, sensation intact           Hospital Course:  28 y.o. male admitted to [facility] to services of Tr Gonsales for treatment of bilateral septic knees.  Aspiration in the office showed positive MRSA.  He is admitted for bilateral arthroscopic irrigation and debridement.  Infectious disease was consulted and the patient was appropriately evaluated and started on IV antibiotic therapy with vancomycin and Rocephin.  PICC line was placed.  The drains were pulled on the first postoperative day.  Appropriate education re: IV antibiotic management, incision care, activity levels, medications, and follow up visits was completed and all questions were answered. The patient is now deemed stable for discharge to Home.    Discharge and Follow up Instructions: Follow up in 10-14 days.    Date: [unfilled]  Tr Gonsales MD

## 2023-11-25 LAB
BACTERIA FLD CULT: NORMAL
GRAM STN SPEC: NORMAL
GRAM STN SPEC: NORMAL

## 2023-11-27 LAB
BACTERIA SPEC AEROBE CULT: NORMAL
BACTERIA SPEC AEROBE CULT: NORMAL

## 2023-11-28 ENCOUNTER — APPOINTMENT (OUTPATIENT)
Dept: GENERAL RADIOLOGY | Facility: HOSPITAL | Age: 28
End: 2023-11-28
Payer: COMMERCIAL

## 2023-11-28 ENCOUNTER — APPOINTMENT (OUTPATIENT)
Dept: CT IMAGING | Facility: HOSPITAL | Age: 28
End: 2023-11-28
Payer: COMMERCIAL

## 2023-11-28 ENCOUNTER — APPOINTMENT (OUTPATIENT)
Dept: CARDIOLOGY | Facility: HOSPITAL | Age: 28
End: 2023-11-28
Payer: COMMERCIAL

## 2023-11-28 ENCOUNTER — HOSPITAL ENCOUNTER (EMERGENCY)
Facility: HOSPITAL | Age: 28
Discharge: HOME | End: 2023-11-28
Attending: EMERGENCY MEDICINE | Admitting: EMERGENCY MEDICINE
Payer: COMMERCIAL

## 2023-11-28 VITALS
HEART RATE: 88 BPM | BODY MASS INDEX: 29.4 KG/M2 | HEIGHT: 71 IN | OXYGEN SATURATION: 100 % | SYSTOLIC BLOOD PRESSURE: 143 MMHG | RESPIRATION RATE: 16 BRPM | WEIGHT: 210 LBS | TEMPERATURE: 97.6 F | DIASTOLIC BLOOD PRESSURE: 93 MMHG

## 2023-11-28 DIAGNOSIS — R07.81 PLEURITIC CHEST PAIN: ICD-10-CM

## 2023-11-28 DIAGNOSIS — R03.0 ELEVATED BLOOD PRESSURE READING: ICD-10-CM

## 2023-11-28 DIAGNOSIS — R00.2 PALPITATIONS: Primary | ICD-10-CM

## 2023-11-28 LAB
ALBUMIN SERPL-MCNC: 4 G/DL (ref 3.5–5.2)
ALBUMIN/GLOB SERPL: 1.1 G/DL
ALP SERPL-CCNC: 120 U/L (ref 39–117)
ALT SERPL W P-5'-P-CCNC: 58 U/L (ref 1–41)
ANION GAP SERPL CALCULATED.3IONS-SCNC: 11.7 MMOL/L (ref 5–15)
AST SERPL-CCNC: 25 U/L (ref 1–40)
BASOPHILS # BLD AUTO: 0.1 10*3/MM3 (ref 0–0.2)
BASOPHILS NFR BLD AUTO: 1 % (ref 0–1.5)
BILIRUB SERPL-MCNC: 0.3 MG/DL (ref 0–1.2)
BUN SERPL-MCNC: 17 MG/DL (ref 6–20)
BUN/CREAT SERPL: 17 (ref 7–25)
CALCIUM SPEC-SCNC: 9.7 MG/DL (ref 8.6–10.5)
CHLORIDE SERPL-SCNC: 101 MMOL/L (ref 98–107)
CO2 SERPL-SCNC: 25.3 MMOL/L (ref 22–29)
CREAT SERPL-MCNC: 1 MG/DL (ref 0.76–1.27)
D DIMER PPP FEU-MCNC: 5.15 MCGFEU/ML (ref 0–0.5)
DEPRECATED RDW RBC AUTO: 37.8 FL (ref 37–54)
EGFRCR SERPLBLD CKD-EPI 2021: 105.1 ML/MIN/1.73
EOSINOPHIL # BLD AUTO: 0.19 10*3/MM3 (ref 0–0.4)
EOSINOPHIL NFR BLD AUTO: 1.9 % (ref 0.3–6.2)
ERYTHROCYTE [DISTWIDTH] IN BLOOD BY AUTOMATED COUNT: 12.9 % (ref 12.3–15.4)
GLOBULIN UR ELPH-MCNC: 3.5 GM/DL
GLUCOSE SERPL-MCNC: 129 MG/DL (ref 65–99)
HCT VFR BLD AUTO: 37.3 % (ref 37.5–51)
HGB BLD-MCNC: 12.7 G/DL (ref 13–17.7)
IMM GRANULOCYTES # BLD AUTO: 0.17 10*3/MM3 (ref 0–0.05)
IMM GRANULOCYTES NFR BLD AUTO: 1.7 % (ref 0–0.5)
LYMPHOCYTES # BLD AUTO: 2.02 10*3/MM3 (ref 0.7–3.1)
LYMPHOCYTES NFR BLD AUTO: 20.4 % (ref 19.6–45.3)
MCH RBC QN AUTO: 27.9 PG (ref 26.6–33)
MCHC RBC AUTO-ENTMCNC: 34 G/DL (ref 31.5–35.7)
MCV RBC AUTO: 82 FL (ref 79–97)
MONOCYTES # BLD AUTO: 0.85 10*3/MM3 (ref 0.1–0.9)
MONOCYTES NFR BLD AUTO: 8.6 % (ref 5–12)
NEUTROPHILS NFR BLD AUTO: 6.56 10*3/MM3 (ref 1.7–7)
NEUTROPHILS NFR BLD AUTO: 66.4 % (ref 42.7–76)
NRBC BLD AUTO-RTO: 0 /100 WBC (ref 0–0.2)
PLATELET # BLD AUTO: 341 10*3/MM3 (ref 140–450)
PMV BLD AUTO: 9.2 FL (ref 6–12)
POTASSIUM SERPL-SCNC: 3.7 MMOL/L (ref 3.5–5.2)
PROT SERPL-MCNC: 7.5 G/DL (ref 6–8.5)
RBC # BLD AUTO: 4.55 10*6/MM3 (ref 4.14–5.8)
SODIUM SERPL-SCNC: 138 MMOL/L (ref 136–145)
TROPONIN T SERPL HS-MCNC: 6 NG/L
WBC NRBC COR # BLD AUTO: 9.89 10*3/MM3 (ref 3.4–10.8)

## 2023-11-28 PROCEDURE — 84484 ASSAY OF TROPONIN QUANT: CPT | Performed by: PHYSICIAN ASSISTANT

## 2023-11-28 PROCEDURE — 93226 XTRNL ECG REC<48 HR SCAN A/R: CPT

## 2023-11-28 PROCEDURE — 93010 ELECTROCARDIOGRAM REPORT: CPT | Performed by: INTERNAL MEDICINE

## 2023-11-28 PROCEDURE — 71275 CT ANGIOGRAPHY CHEST: CPT

## 2023-11-28 PROCEDURE — 71045 X-RAY EXAM CHEST 1 VIEW: CPT

## 2023-11-28 PROCEDURE — 25510000001 IOPAMIDOL PER 1 ML: Performed by: EMERGENCY MEDICINE

## 2023-11-28 PROCEDURE — 99285 EMERGENCY DEPT VISIT HI MDM: CPT

## 2023-11-28 PROCEDURE — 36415 COLL VENOUS BLD VENIPUNCTURE: CPT

## 2023-11-28 PROCEDURE — 85379 FIBRIN DEGRADATION QUANT: CPT | Performed by: PHYSICIAN ASSISTANT

## 2023-11-28 PROCEDURE — 80053 COMPREHEN METABOLIC PANEL: CPT | Performed by: PHYSICIAN ASSISTANT

## 2023-11-28 PROCEDURE — 93225 XTRNL ECG REC<48 HRS REC: CPT

## 2023-11-28 PROCEDURE — 93005 ELECTROCARDIOGRAM TRACING: CPT | Performed by: PHYSICIAN ASSISTANT

## 2023-11-28 PROCEDURE — 85025 COMPLETE CBC W/AUTO DIFF WBC: CPT | Performed by: PHYSICIAN ASSISTANT

## 2023-11-28 RX ADMIN — IOPAMIDOL 95 ML: 755 INJECTION, SOLUTION INTRAVENOUS at 19:59

## 2023-11-28 NOTE — ED NOTES
Patient states he has a picc line, he has been getting antibiotics through it. He had a treatment today and they told him that his blood pressure was a little high but to check it again at home. He checked it again and it was 138/98 at home. He also states his heart rate was high. His heart rate here is 116. Patient has no pain at this time.

## 2023-11-29 LAB
QT INTERVAL: 347 MS
QTC INTERVAL: 420 MS

## 2023-11-29 NOTE — ED PROVIDER NOTES
" EMERGENCY DEPARTMENT ENCOUNTER    Room Number:  02/02  PCP: Tr Shaw MD  Discussed/ obtained information from independent historians: patient      HPI:  Chief Complaint: palpitations, high blood pressure  A complete HPI/ROS/PMH/PSH/SH/FH are unobtainable due to: none  Context: Lamont Crowley is a 28 y.o. male who presents to the ED c/o high blood pressure and intermittent palpitations.  Approximately 1 week ago the patient had arthroscopic surgeries on both knees due to infections in his knees.  He currently has a PICC line in the right upper extremity in a receiving twice daily vancomycin infusions at home.  During a medical visit today he was told his blood pressure was elevated.  He was evaluated at an urgent care and blood pressure was elevated but no intervention indicated.  Throughout the day today he has noticed some exertional palpitations with a heart rate as high as 150.  He talked to his infectious disease doctor and it was felt he should come to the ER for further evaluation for possible pulmonary embolism.  He does report some chest tightness on the left just left of his sternum that is pleuritic in nature.  He denies any shortness of breath or labored breathing, lower extremity edema or calf pain or hemoptysis.  He denies fevers.  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      External (non-ED) record review: Patient admitted 11/20/2023 until 11/22/2023 due to bilateral septic knees-staphylococcal arthritis.  He was found to have bilateral knee effusions and culture of the right knee came back positive for MRSA.  He underwent bilateral knee arthroscopy with irrigation and debridement on 11/21/2023 and was discharged on and continued infusions of vancomycin at the recommendations of infectious disease.      PAST MEDICAL HISTORY  Active Ambulatory Problems     Diagnosis Date Noted    Septic joint of right knee joint " 11/21/2023     Resolved Ambulatory Problems     Diagnosis Date Noted    No Resolved Ambulatory Problems     Past Medical History:   Diagnosis Date    Knee pain          PAST SURGICAL HISTORY  Past Surgical History:   Procedure Laterality Date    HUMERUS FRACTURE SURGERY Bilateral     ELBOWS    KNEE ARTHROSCOPY Bilateral 11/21/2023    Procedure: BILATERAL KNEE ARTHROSCOPY WITH IRRIGATION AND DEBRIDEMENT;  Surgeon: Tr Gonsales MD;  Location: [facility] OR [facility] [place];  Service: Orthopedics;  Laterality: Bilateral;         FAMILY HISTORY  Family History   Problem Relation Age of Onset    Malig Hyperthermia Neg Hx          SOCIAL HISTORY  Social History     Socioeconomic History    Marital status:    Tobacco Use    Smoking status: Never    Smokeless tobacco: Never   Vaping Use    Vaping Use: Never used   Substance and Sexual Activity    Alcohol use: Yes     Comment: SPECIAL OCCASIONS    Drug use: Never    Sexual activity: Yes     Partners: Female     Comment: one partner          ALLERGIES  Patient has no known allergies.        REVIEW OF SYSTEMS  Review of Systems         PHYSICAL EXAM  ED Triage Vitals   Temp Heart Rate Resp BP SpO2   11/28/23 1849 11/28/23 1849 11/28/23 1849 11/28/23 1854 11/28/23 1849   97.6 °F (36.4 °C) 116 16 154/99 100 %      Temp src Heart Rate Source Patient Position BP Location FiO2 (%)   -- -- 11/28/23 1854 11/28/23 1854 --     Sitting Left arm        Physical Exam      GENERAL: no acute distress  HENT: normocephalic, atraumatic  EYES: no scleral icterus  CV: regular rhythm, normal rate, no lower extremity edema.  PICC inserted in the right upper extremity, insertion site clean and dry and unremarkable.  RESPIRATORY: normal effort, CTA B, no chest wall tenderness  ABDOMEN: nondistended  MUSCULOSKELETAL: no deformity  NEURO: alert, moves all extremities, follows commands  PSYCH:  calm, cooperative  SKIN: warm, dry    Vital signs and nursing notes reviewed.          LAB RESULTS  Recent  Results (from the past 24 hour(s))   ECG 12 Lead Chest Pain    Collection Time: 11/28/23  7:29 PM   Result Value Ref Range    QT Interval 347 ms    QTC Interval 420 ms   Comprehensive Metabolic Panel    Collection Time: 11/28/23  7:38 PM    Specimen: Blood   Result Value Ref Range    Glucose 129 (H) 65 - 99 mg/dL    BUN 17 6 - 20 mg/dL    Creatinine 1.00 0.76 - 1.27 mg/dL    Sodium 138 136 - 145 mmol/L    Potassium 3.7 3.5 - 5.2 mmol/L    Chloride 101 98 - 107 mmol/L    CO2 25.3 22.0 - 29.0 mmol/L    Calcium 9.7 8.6 - 10.5 mg/dL    Total Protein 7.5 6.0 - 8.5 g/dL    Albumin 4.0 3.5 - 5.2 g/dL    ALT (SGPT) 58 (H) 1 - 41 U/L    AST (SGOT) 25 1 - 40 U/L    Alkaline Phosphatase 120 (H) 39 - 117 U/L    Total Bilirubin 0.3 0.0 - 1.2 mg/dL    Globulin 3.5 gm/dL    A/G Ratio 1.1 g/dL    BUN/Creatinine Ratio 17.0 7.0 - 25.0    Anion Gap 11.7 5.0 - 15.0 mmol/L    eGFR 105.1 >60.0 mL/min/1.73   High Sensitivity Troponin T    Collection Time: 11/28/23  7:38 PM    Specimen: Blood   Result Value Ref Range    HS Troponin T 6 <22 ng/L   D-dimer, Quantitative    Collection Time: 11/28/23  7:38 PM    Specimen: Blood   Result Value Ref Range    D-Dimer, Quantitative 5.15 (H) 0.00 - 0.50 MCGFEU/mL   CBC Auto Differential    Collection Time: 11/28/23  7:38 PM    Specimen: Blood   Result Value Ref Range    WBC 9.89 3.40 - 10.80 10*3/mm3    RBC 4.55 4.14 - 5.80 10*6/mm3    Hemoglobin 12.7 (L) 13.0 - 17.7 g/dL    Hematocrit 37.3 (L) 37.5 - 51.0 %    MCV 82.0 79.0 - 97.0 fL    MCH 27.9 26.6 - 33.0 pg    MCHC 34.0 31.5 - 35.7 g/dL    RDW 12.9 12.3 - 15.4 %    RDW-SD 37.8 37.0 - 54.0 fl    MPV 9.2 6.0 - 12.0 fL    Platelets 341 140 - 450 10*3/mm3    Neutrophil % 66.4 42.7 - 76.0 %    Lymphocyte % 20.4 19.6 - 45.3 %    Monocyte % 8.6 5.0 - 12.0 %    Eosinophil % 1.9 0.3 - 6.2 %    Basophil % 1.0 0.0 - 1.5 %    Immature Grans % 1.7 (H) 0.0 - 0.5 %    Neutrophils, Absolute 6.56 1.70 - 7.00 10*3/mm3    Lymphocytes, Absolute 2.02 0.70 - 3.10  10*3/mm3    Monocytes, Absolute 0.85 0.10 - 0.90 10*3/mm3    Eosinophils, Absolute 0.19 0.00 - 0.40 10*3/mm3    Basophils, Absolute 0.10 0.00 - 0.20 10*3/mm3    Immature Grans, Absolute 0.17 (H) 0.00 - 0.05 10*3/mm3    nRBC 0.0 0.0 - 0.2 /100 WBC       Ordered the above labs and reviewed the results.        RADIOLOGY  CT Angiogram Chest    Result Date: 11/28/2023  CT ANGIOGRAM CHEST-  INDICATIONS: Elevated heart rate.  TECHNIQUE: Radiation dose reduction techniques were utilized, including automated exposure control and exposure modulation based on body size. CT angiography of the chest. Three-dimensional reconstructions.  COMPARISON: None available  FINDINGS:  No pulmonary embolism. No aortic dissection.  The heart size is normal without pericardial effusion. A few small subcentimeter short axis mediastinal lymph nodes are seen that are not significant by size criteria.  The airways appear clear.  No pleural effusion or pneumothorax.  The lungs show no focal pulmonary consolidation or mass.  Upper abdominal structures show no acute findings.  Endplate changes are seen in the spine. No acute fracture is identified.       No pulmonary embolism.  This report was finalized on 11/28/2023 8:14 PM by Dr. Kleber Julien M.D on Workstation: [employer]      XR Chest 1 View    Result Date: 11/28/2023  XR CHEST 1 VW-  HISTORY: Male who is 28 years-old, chest pain  TECHNIQUE: Frontal view of the chest  COMPARISON: None available  FINDINGS: Right PICC extends to the superior vena cava. Heart, mediastinum and pulmonary vasculature are unremarkable. No focal pulmonary consolidation, pleural effusion, or pneumothorax. No acute osseous process.      No evidence for acute pulmonary process. Follow-up as clinical indications persist.  This report was finalized on 11/28/2023 8:11 PM by Dr. Kleber Julien M.D on Workstation: [employer]       Ordered the above noted radiological studies. Reviewed by me in PACS.             PROCEDURES  Procedures              MEDICATIONS GIVEN IN ER  Medications   iopamidol (ISOVUE-370) 76 % injection 95 mL (95 mL Intravenous Given 11/28/23 1959)                   MEDICAL DECISION MAKING, PROGRESS, and CONSULTS    All labs have been independently reviewed by me.  All radiology studies have been reviewed by me and I have also reviewed the radiology report.   EKG's independently viewed and interpreted by me.  Discussion below represents my analysis of pertinent findings related to patient's condition, differential diagnosis, treatment plan and final disposition.            Orders placed during this visit:  Orders Placed This Encounter   Procedures    XR Chest 1 View    CT Angiogram Chest    Comprehensive Metabolic Panel    High Sensitivity Troponin T    D-dimer, Quantitative    CBC Auto Differential    Monitor Blood Pressure    Pulse Oximetry, Continuous    Holter Monitor - 48 Hour    ECG 12 Lead Chest Pain    CBC & Differential           Differential diagnosis:  DDx includes but is not limited to acute coronary syndrome, pulmonary embolism, thoracic aortic dissection, pneumonia, pneumothorax, musculoskeletal pain, GERD or esophageal spasm, anxiety, myocarditis/pericarditis, esophageal rupture, pancreatitis.           Independent interpretation of labs, radiology studies, and discussions with consultants:  ED Course as of 11/28/23 2100   Tue Nov 28, 2023 2025 D-Dimer, Quant(!): 5.15 [KA]   2025 Glucose(!): 129 [KA]   2025 Creatinine: 1.00 [KA]   2025 HS Troponin T: 6 [KA]   2025 WBC: 9.89 [KA]   2025 Hemoglobin(!): 12.7 [KA]   2026 My own independent interpretation of the EKG that was done at 729 reveals a rate of 88 it is sinus rhythm there is mild intravelar conduction delay normal axis I do not appreciate any acute injury pattern QT looks normal no old EKG to compare with [MM]   2035 I reevaluated the patient.  Heart rate is normal.  Blood pressure is unremarkable.  Oxygen saturation is  100% on room air.  Talk with the patient as well as spouse about the results of the test at length.  Informed to stop the caffeine.  Informed to continue to monitor the blood pressure but it could be a side effect of the caffeine and Benadryl.  All questions answered at this time. [MM]   2100 ALT (SGPT)(!): 58 [KA]   2100 Alkaline Phosphatase(!): 120  LFTs chronically elevated [KA]      ED Course User Index  [KA] Liberty Fall PA-C  [MM] Levon Newberry MD       - Shared decision making: Recommended outpatient Holter for further evaluation of elevated heart rate and palpitations, patient agreeable    Additional orders considered but not ordered:  I considered admitting the patient to the hospital however his ischemic work-up is negative, he has no evidence of pulmonary embolism, symptoms are atypical, he is stable for outpatient treatment      Additional sources:    - Chronic or social conditions impacting care: PICC line, on outpatient vancomycin infusions, recent surgery          DIAGNOSIS  Final diagnoses:   Palpitations   Pleuritic chest pain   Elevated blood pressure reading           Follow Up:  Tr Shaw MD  [address] [facility] [address]  [facility] [id number] [phone]    In 2 days      [facility] EMERGENCY DEPARTMENT  [address]  [facility] [address] [phone]    If symptoms worsen or any concerns      RX:     Medication List      No changes were made to your prescriptions during this visit.         Latest Documented Vital Signs:  As of 21:00 EST  BP- 143/93 HR- 88 Temp- 97.6 °F (36.4 °C) O2 sat- 100%              --    Please note that portions of this were completed with a voice recognition program.       Note Disclaimer: At [facility], we believe that sharing information builds trust and better relationships. You are receiving this note because you are receiving care at [facility] or recently visited. It is possible you will see health information  before a provider has talked with you about it. This kind of information can be easy to misunderstand. To help you fully understand what it means for your health, we urge you to discuss this note with your provider.             Liberty Fall PA-C  11/28/23 2059       Liberty Fall PA-C  11/28/23 2100       Liberty Fall PA-C  11/28/23 2100

## 2023-11-29 NOTE — ED PROVIDER NOTES
"I supervised care provided by the midlevel provider.   We have discussed this patient's history, physical exam, and treatment plan.  I have reviewed the note and personally saw and examined the patient and agree with the plan of care.   I obtained history from patient, spouse, Meghan Oglesby.  This patient was sent here to the emergency department by Dr. Berry to rule out pulmonary embolism.  This is a gentleman who recently diagnosed with MRSA infection to his knees.  He recently had a procedure done by the orthopedic surgeon Dr. Gonsales where they washed out his knees.  He has a PICC line to his right upper extremity and he is getting vancomycin.  He is received vancomycin for the past week IV.  He does take Benadryl twice daily to try to prevent \"red man\" syndrome associated with vancomycin.  He checked his blood pressure today and it was elevated at 150/101.  Went to the urgent care center and they told him to continue to monitor it.  They have also noted some periods where his heart rate was elevated.  They communicated with the physician that was on call and spoke with Dr. Berry who recommended that he come here to the emergency department as mentioned above.  He is anxious.  He denies any fevers or chills.  Denies any shortness of breath.  He states for the past 3 to 4 days at least possibly longer he has had these brief episodes of chest discomfort.  He says it feels like a very light push on the center of his chest.  They will last less than a minute.  Sometimes they will last just a few seconds sometimes a little longer.  But they are not of long duration and they are very mild.  They might occur anywhere from 5-10 times a day.  He is not aware of any trigger to the symptoms.  Is not worse with deep breathing is not worse with any exertion.  He is not having any symptoms now.  He has had no coughs or colds.  Denies any new pain or swelling to his lower extremities.  He has no history of heart disease.  He does " not take any illegal drugs does not have a history of high blood pressure elevated cholesterol and does not smoke.  He is on the Benadryl, vancomycin and aspirin.  He has had no history of high blood pressure before.    GENERAL: Pleasant young male.  Not distressed on my evaluation his O2 sats 100% on room air.  His heart rate is in the 80s.  His blood pressure is 143/93.  HENT: nares patent  Head/neck/ face are symmetric without gross deformity or swelling  EYES: no scleral icterus  CV: regular rhythm, regular rate with intact distal pulses.  Normal inspection.  No murmur or rub.  Pain is not reproducible to palpation or deep breathing.  RESPIRATORY: normal effort and no respiratory distress.  Clear to auscultation bilaterally  ABDOMEN: soft and nontender  MUSCULOSKELETAL: no deformity to his right upper extremity he has a PICC line.  There is no obvious swelling or erythema or tenderness with palpation.  Intact distal pulses to upper and lower extremities are equal strong and symmetric.  NEURO: alert and appropriate, moves all extremities, follows commands  SKIN: warm, dry    Vital signs and nursing notes reviewed.    Plan   ED Course as of 11/29/23 0437   Tue Nov 28, 2023 2025 D-Dimer, Quant(!): 5.15 [KA]   2025 Glucose(!): 129 [KA]   2025 Creatinine: 1.00 [KA]   2025 HS Troponin T: 6 [KA]   2025 WBC: 9.89 [KA]   2025 Hemoglobin(!): 12.7 [KA]   2026 My own independent interpretation of the EKG that was done at 729 reveals a rate of 88 it is sinus rhythm there is mild intravelar conduction delay normal axis I do not appreciate any acute injury pattern QT looks normal no old EKG to compare with [MM]   2035 I reevaluated the patient.  Heart rate is normal.  Blood pressure is unremarkable.  Oxygen saturation is 100% on room air.  Talk with the patient as well as spouse about the results of the test at length.  Informed to stop the caffeine.  Informed to continue to monitor the blood pressure but it could be a side  "effect of the caffeine and Benadryl.  All questions answered at this time. [MM]   2100 ALT (SGPT)(!): 58 [KA]   2100 Alkaline Phosphatase(!): 120  LFTs chronically elevated [KA]      ED Course User Index  [KA] Liberty Fall PA-C  [MM] Levon Newberry MD         This is a gentleman that was sent here to rule out pulmonary embolism.  We will get a go ahead and do a CT angiogram of his chest.  Clinically I have a low index suspicion that he does have a pulmonary embolism.  His dimer was elevated at 5 but very well is likely elevated given his recent MRSA infection.  Currently his heart rate is normal.  His tachycardia and hypertension very well could be related to the Benadryl.  He is taking that regularly to prevent \"red man\" syndrome from vancomycin.  He is not having any symptoms of any emergent condition to immediately lower his blood pressure with IV drips.  He is not have any signs of hypertensive emergency.  We will continue to monitor his blood pressure.  I have looked at his EKG and is unremarkable.  Discussed this plan with the patient and spouse as well as Meghan at length.  All questions answered at this time.         Levon Newberry MD  11/29/23 5625    "

## 2023-12-20 ENCOUNTER — LAB (OUTPATIENT)
Dept: LAB | Facility: HOSPITAL | Age: 28
End: 2023-12-20
Payer: COMMERCIAL

## 2023-12-20 ENCOUNTER — TRANSCRIBE ORDERS (OUTPATIENT)
Dept: ADMINISTRATIVE | Facility: HOSPITAL | Age: 28
End: 2023-12-20
Payer: COMMERCIAL

## 2023-12-20 DIAGNOSIS — Z00.00 ROUTINE GENERAL MEDICAL EXAMINATION AT A HEALTH CARE FACILITY: Primary | ICD-10-CM

## 2023-12-20 DIAGNOSIS — Z00.00 ROUTINE GENERAL MEDICAL EXAMINATION AT A HEALTH CARE FACILITY: ICD-10-CM

## 2023-12-20 LAB
ALBUMIN SERPL-MCNC: 4.5 G/DL (ref 3.5–5.2)
ALBUMIN/GLOB SERPL: 1.3 G/DL
ALP SERPL-CCNC: 89 U/L (ref 39–117)
ALT SERPL W P-5'-P-CCNC: 22 U/L (ref 1–41)
ANION GAP SERPL CALCULATED.3IONS-SCNC: 11.9 MMOL/L (ref 5–15)
AST SERPL-CCNC: 19 U/L (ref 1–40)
BASOPHILS # BLD AUTO: 0.09 10*3/MM3 (ref 0–0.2)
BASOPHILS NFR BLD AUTO: 1.3 % (ref 0–1.5)
BILIRUB SERPL-MCNC: 0.5 MG/DL (ref 0–1.2)
BILIRUB UR QL STRIP: NEGATIVE
BUN SERPL-MCNC: 17 MG/DL (ref 6–20)
BUN/CREAT SERPL: 16.5 (ref 7–25)
CALCIUM SPEC-SCNC: 10.1 MG/DL (ref 8.6–10.5)
CHLORIDE SERPL-SCNC: 101 MMOL/L (ref 98–107)
CHOLEST SERPL-MCNC: 163 MG/DL (ref 0–200)
CLARITY UR: CLEAR
CO2 SERPL-SCNC: 25.1 MMOL/L (ref 22–29)
COLOR UR: YELLOW
CREAT SERPL-MCNC: 1.03 MG/DL (ref 0.76–1.27)
DEPRECATED RDW RBC AUTO: 41.2 FL (ref 37–54)
EGFRCR SERPLBLD CKD-EPI 2021: 101.5 ML/MIN/1.73
EOSINOPHIL # BLD AUTO: 0.2 10*3/MM3 (ref 0–0.4)
EOSINOPHIL NFR BLD AUTO: 2.9 % (ref 0.3–6.2)
ERYTHROCYTE [DISTWIDTH] IN BLOOD BY AUTOMATED COUNT: 14 % (ref 12.3–15.4)
GLOBULIN UR ELPH-MCNC: 3.4 GM/DL
GLUCOSE SERPL-MCNC: 95 MG/DL (ref 65–99)
GLUCOSE UR STRIP-MCNC: NEGATIVE MG/DL
HCT VFR BLD AUTO: 40.7 % (ref 37.5–51)
HDLC SERPL-MCNC: 51 MG/DL (ref 40–60)
HGB BLD-MCNC: 13.9 G/DL (ref 13–17.7)
HGB UR QL STRIP.AUTO: NEGATIVE
IMM GRANULOCYTES # BLD AUTO: 0.03 10*3/MM3 (ref 0–0.05)
IMM GRANULOCYTES NFR BLD AUTO: 0.4 % (ref 0–0.5)
KETONES UR QL STRIP: NEGATIVE
LDLC SERPL CALC-MCNC: 101 MG/DL (ref 0–100)
LDLC/HDLC SERPL: 1.99 {RATIO}
LEUKOCYTE ESTERASE UR QL STRIP.AUTO: NEGATIVE
LYMPHOCYTES # BLD AUTO: 2.11 10*3/MM3 (ref 0.7–3.1)
LYMPHOCYTES NFR BLD AUTO: 30.8 % (ref 19.6–45.3)
MCH RBC QN AUTO: 28.1 PG (ref 26.6–33)
MCHC RBC AUTO-ENTMCNC: 34.2 G/DL (ref 31.5–35.7)
MCV RBC AUTO: 82.4 FL (ref 79–97)
MONOCYTES # BLD AUTO: 0.85 10*3/MM3 (ref 0.1–0.9)
MONOCYTES NFR BLD AUTO: 12.4 % (ref 5–12)
NEUTROPHILS NFR BLD AUTO: 3.58 10*3/MM3 (ref 1.7–7)
NEUTROPHILS NFR BLD AUTO: 52.2 % (ref 42.7–76)
NITRITE UR QL STRIP: NEGATIVE
NRBC BLD AUTO-RTO: 0 /100 WBC (ref 0–0.2)
PH UR STRIP.AUTO: 6 [PH] (ref 5–8)
PLATELET # BLD AUTO: 200 10*3/MM3 (ref 140–450)
PMV BLD AUTO: 10.9 FL (ref 6–12)
POTASSIUM SERPL-SCNC: 4.3 MMOL/L (ref 3.5–5.2)
PROT SERPL-MCNC: 7.9 G/DL (ref 6–8.5)
PROT UR QL STRIP: NEGATIVE
RBC # BLD AUTO: 4.94 10*6/MM3 (ref 4.14–5.8)
SODIUM SERPL-SCNC: 138 MMOL/L (ref 136–145)
SP GR UR STRIP: 1.01 (ref 1–1.03)
TRIGL SERPL-MCNC: 53 MG/DL (ref 0–150)
UROBILINOGEN UR QL STRIP: NORMAL
VLDLC SERPL-MCNC: 11 MG/DL (ref 5–40)
WBC NRBC COR # BLD AUTO: 6.86 10*3/MM3 (ref 3.4–10.8)

## 2023-12-20 PROCEDURE — 80061 LIPID PANEL: CPT

## 2023-12-20 PROCEDURE — 80053 COMPREHEN METABOLIC PANEL: CPT

## 2023-12-20 PROCEDURE — 36415 COLL VENOUS BLD VENIPUNCTURE: CPT

## 2023-12-20 PROCEDURE — 81003 URINALYSIS AUTO W/O SCOPE: CPT

## 2023-12-20 PROCEDURE — 85025 COMPLETE CBC W/AUTO DIFF WBC: CPT

## 2024-04-12 ENCOUNTER — LAB (OUTPATIENT)
Dept: LAB | Facility: HOSPITAL | Age: 29
End: 2024-04-12
Payer: COMMERCIAL

## 2024-04-12 ENCOUNTER — TRANSCRIBE ORDERS (OUTPATIENT)
Dept: ADMINISTRATIVE | Facility: HOSPITAL | Age: 29
End: 2024-04-12
Payer: COMMERCIAL

## 2024-04-12 DIAGNOSIS — I10 HYPERTENSION, UNSPECIFIED TYPE: Primary | ICD-10-CM

## 2024-04-12 DIAGNOSIS — I10 HYPERTENSION, UNSPECIFIED TYPE: ICD-10-CM

## 2024-04-12 LAB
ANION GAP SERPL CALCULATED.3IONS-SCNC: 10 MMOL/L (ref 5–15)
BUN SERPL-MCNC: 26 MG/DL (ref 6–20)
BUN/CREAT SERPL: 22.8 (ref 7–25)
CALCIUM SPEC-SCNC: 9.5 MG/DL (ref 8.6–10.5)
CHLORIDE SERPL-SCNC: 103 MMOL/L (ref 98–107)
CO2 SERPL-SCNC: 25 MMOL/L (ref 22–29)
CREAT SERPL-MCNC: 1.14 MG/DL (ref 0.76–1.27)
EGFRCR SERPLBLD CKD-EPI 2021: 89.8 ML/MIN/1.73
GLUCOSE SERPL-MCNC: 106 MG/DL (ref 65–99)
POTASSIUM SERPL-SCNC: 4.5 MMOL/L (ref 3.5–5.2)
SODIUM SERPL-SCNC: 138 MMOL/L (ref 136–145)

## 2024-04-12 PROCEDURE — 80048 BASIC METABOLIC PNL TOTAL CA: CPT

## 2024-04-12 PROCEDURE — 36415 COLL VENOUS BLD VENIPUNCTURE: CPT

## 2024-07-24 NOTE — ANESTHESIA POSTPROCEDURE EVALUATION
Patient: Lamont Crowley    Procedure Summary       Date: 11/21/23 Room / Location:  [facility] [address] [place] [address] [facility]    Anesthesia Start: 1703 Anesthesia Stop: 1839    Procedure: BILATERAL KNEE ARTHROSCOPY WITH IRRIGATION AND DEBRIDEMENT (Bilateral: Knee) Diagnosis:     Surgeons: Tr Gonsales MD Provider: Jase Palmer MD    Anesthesia Type: general ASA Status: 1            Anesthesia Type: general    Vitals  Vitals Value Taken Time   /73 11/21/23 1945   Temp 36.4 °C (97.6 °F) 11/21/23 1930   Pulse 74 11/21/23 1945   Resp 15 11/21/23 1836   SpO2 100 % 11/21/23 1945           Anesthesia Post Evaluation

## 2024-09-26 ENCOUNTER — TRANSCRIBE ORDERS (OUTPATIENT)
Dept: ADMINISTRATIVE | Facility: HOSPITAL | Age: 29
End: 2024-09-26
Payer: COMMERCIAL

## 2024-09-26 ENCOUNTER — LAB (OUTPATIENT)
Dept: LAB | Facility: HOSPITAL | Age: 29
End: 2024-09-26
Payer: COMMERCIAL

## 2024-09-26 DIAGNOSIS — M25.462 EFFUSION OF LEFT KNEE: Primary | ICD-10-CM

## 2024-09-26 DIAGNOSIS — M25.462 EFFUSION OF LEFT KNEE: ICD-10-CM

## 2024-09-26 LAB
ALBUMIN SERPL-MCNC: 4.6 G/DL (ref 3.5–5.2)
ALBUMIN/GLOB SERPL: 1.6 G/DL
ALP SERPL-CCNC: 91 U/L (ref 39–117)
ALT SERPL W P-5'-P-CCNC: 18 U/L (ref 1–41)
ANION GAP SERPL CALCULATED.3IONS-SCNC: 10 MMOL/L (ref 5–15)
APPEARANCE FLD: ABNORMAL
AST SERPL-CCNC: 17 U/L (ref 1–40)
BILIRUB SERPL-MCNC: 0.5 MG/DL (ref 0–1.2)
BUN SERPL-MCNC: 18 MG/DL (ref 6–20)
BUN/CREAT SERPL: 16.1 (ref 7–25)
CALCIUM SPEC-SCNC: 9.6 MG/DL (ref 8.6–10.5)
CHLORIDE SERPL-SCNC: 105 MMOL/L (ref 98–107)
CO2 SERPL-SCNC: 24 MMOL/L (ref 22–29)
COLOR FLD: YELLOW
CREAT SERPL-MCNC: 1.12 MG/DL (ref 0.76–1.27)
CRP SERPL-MCNC: 2.93 MG/DL (ref 0–0.5)
CRYSTALS FLD MICRO: NORMAL
DEPRECATED RDW RBC AUTO: 39.3 FL (ref 37–54)
EGFRCR SERPLBLD CKD-EPI 2021: 91.2 ML/MIN/1.73
ERYTHROCYTE [DISTWIDTH] IN BLOOD BY AUTOMATED COUNT: 12.6 % (ref 12.3–15.4)
ERYTHROCYTE [SEDIMENTATION RATE] IN BLOOD: 6 MM/HR (ref 0–15)
GLOBULIN UR ELPH-MCNC: 2.9 GM/DL
GLUCOSE SERPL-MCNC: 102 MG/DL (ref 65–99)
HCT VFR BLD AUTO: 43.1 % (ref 37.5–51)
HGB BLD-MCNC: 14.6 G/DL (ref 13–17.7)
LYMPHOCYTES NFR FLD MANUAL: 6 %
MCH RBC QN AUTO: 29.1 PG (ref 26.6–33)
MCHC RBC AUTO-ENTMCNC: 33.9 G/DL (ref 31.5–35.7)
MCV RBC AUTO: 86 FL (ref 79–97)
METHOD: ABNORMAL
MONOCYTES NFR FLD: 36 %
MONOS+MACROS NFR FLD: 35 %
NEUTROPHILS NFR FLD MANUAL: 23 %
NUC CELL # FLD: 5550 /MM3
PLATELET # BLD AUTO: 202 10*3/MM3 (ref 140–450)
PMV BLD AUTO: 10 FL (ref 6–12)
POTASSIUM SERPL-SCNC: 4.3 MMOL/L (ref 3.5–5.2)
PROT SERPL-MCNC: 7.5 G/DL (ref 6–8.5)
RBC # BLD AUTO: 5.01 10*6/MM3 (ref 4.14–5.8)
RBC # FLD AUTO: 795 /MM3
SODIUM SERPL-SCNC: 139 MMOL/L (ref 136–145)
WBC NRBC COR # BLD AUTO: 6.87 10*3/MM3 (ref 3.4–10.8)

## 2024-09-26 PROCEDURE — 87205 SMEAR GRAM STAIN: CPT

## 2024-09-26 PROCEDURE — 36415 COLL VENOUS BLD VENIPUNCTURE: CPT

## 2024-09-26 PROCEDURE — 85652 RBC SED RATE AUTOMATED: CPT

## 2024-09-26 PROCEDURE — 86140 C-REACTIVE PROTEIN: CPT

## 2024-09-26 PROCEDURE — 87070 CULTURE OTHR SPECIMN AEROBIC: CPT

## 2024-09-26 PROCEDURE — 89060 EXAM SYNOVIAL FLUID CRYSTALS: CPT

## 2024-09-26 PROCEDURE — 89051 BODY FLUID CELL COUNT: CPT

## 2024-09-26 PROCEDURE — 80053 COMPREHEN METABOLIC PANEL: CPT

## 2024-09-26 PROCEDURE — 85027 COMPLETE CBC AUTOMATED: CPT

## 2024-10-01 LAB
BACTERIA FLD CULT: NORMAL
GRAM STN SPEC: NORMAL
GRAM STN SPEC: NORMAL

## 2024-12-05 ENCOUNTER — TRANSCRIBE ORDERS (OUTPATIENT)
Dept: ADMINISTRATIVE | Facility: HOSPITAL | Age: 29
End: 2024-12-05
Payer: COMMERCIAL

## 2024-12-05 ENCOUNTER — LAB (OUTPATIENT)
Dept: LAB | Facility: HOSPITAL | Age: 29
End: 2024-12-05
Payer: COMMERCIAL

## 2024-12-05 DIAGNOSIS — M25.562 LEFT KNEE PAIN, UNSPECIFIED CHRONICITY: Primary | ICD-10-CM

## 2024-12-05 DIAGNOSIS — M25.562 LEFT KNEE PAIN, UNSPECIFIED CHRONICITY: ICD-10-CM

## 2024-12-05 LAB
ALBUMIN SERPL-MCNC: 4.4 G/DL (ref 3.5–5.2)
ALBUMIN/GLOB SERPL: 1.2 G/DL
ALP SERPL-CCNC: 99 U/L (ref 39–117)
ALT SERPL W P-5'-P-CCNC: 38 U/L (ref 1–41)
ANION GAP SERPL CALCULATED.3IONS-SCNC: 13 MMOL/L (ref 5–15)
APPEARANCE FLD: ABNORMAL
AST SERPL-CCNC: 27 U/L (ref 1–40)
BASOPHILS # BLD AUTO: 0.05 10*3/MM3 (ref 0–0.2)
BASOPHILS NFR BLD AUTO: 0.6 % (ref 0–1.5)
BILIRUB SERPL-MCNC: 0.6 MG/DL (ref 0–1.2)
BUN SERPL-MCNC: 19 MG/DL (ref 6–20)
BUN/CREAT SERPL: 18.1 (ref 7–25)
CALCIUM SPEC-SCNC: 10 MG/DL (ref 8.6–10.5)
CHLORIDE SERPL-SCNC: 95 MMOL/L (ref 98–107)
CHROMATIN AB SERPL-ACNC: 16 IU/ML (ref 0–14)
CK SERPL-CCNC: 116 U/L (ref 20–200)
CO2 SERPL-SCNC: 27 MMOL/L (ref 22–29)
COLOR FLD: YELLOW
CREAT SERPL-MCNC: 1.05 MG/DL (ref 0.76–1.27)
CRP SERPL-MCNC: 13.59 MG/DL (ref 0–0.5)
CRYSTALS FLD MICRO: NORMAL
DEPRECATED RDW RBC AUTO: 38.7 FL (ref 37–54)
EGFRCR SERPLBLD CKD-EPI 2021: 98.5 ML/MIN/1.73
EOSINOPHIL # BLD AUTO: 0.11 10*3/MM3 (ref 0–0.4)
EOSINOPHIL NFR BLD AUTO: 1.3 % (ref 0.3–6.2)
ERYTHROCYTE [DISTWIDTH] IN BLOOD BY AUTOMATED COUNT: 12.4 % (ref 12.3–15.4)
ERYTHROCYTE [SEDIMENTATION RATE] IN BLOOD: 12 MM/HR (ref 0–15)
GLOBULIN UR ELPH-MCNC: 3.6 GM/DL
GLUCOSE SERPL-MCNC: 107 MG/DL (ref 65–99)
HCT VFR BLD AUTO: 44.5 % (ref 37.5–51)
HGB BLD-MCNC: 15 G/DL (ref 13–17.7)
IMM GRANULOCYTES # BLD AUTO: 0.06 10*3/MM3 (ref 0–0.05)
IMM GRANULOCYTES NFR BLD AUTO: 0.7 % (ref 0–0.5)
LYMPHOCYTES # BLD AUTO: 1.59 10*3/MM3 (ref 0.7–3.1)
LYMPHOCYTES NFR BLD AUTO: 19.1 % (ref 19.6–45.3)
LYMPHOCYTES NFR FLD MANUAL: 7 %
MCH RBC QN AUTO: 28.8 PG (ref 26.6–33)
MCHC RBC AUTO-ENTMCNC: 33.7 G/DL (ref 31.5–35.7)
MCV RBC AUTO: 85.6 FL (ref 79–97)
METHOD: ABNORMAL
MONOCYTES # BLD AUTO: 0.8 10*3/MM3 (ref 0.1–0.9)
MONOCYTES NFR BLD AUTO: 9.6 % (ref 5–12)
MONOCYTES NFR FLD: 23 %
MONOS+MACROS NFR FLD: 4 %
NEUTROPHILS NFR BLD AUTO: 5.72 10*3/MM3 (ref 1.7–7)
NEUTROPHILS NFR BLD AUTO: 68.7 % (ref 42.7–76)
NEUTROPHILS NFR FLD MANUAL: 66 %
NRBC BLD AUTO-RTO: 0 /100 WBC (ref 0–0.2)
NUC CELL # FLD: ABNORMAL /MM3
PLATELET # BLD AUTO: 230 10*3/MM3 (ref 140–450)
PMV BLD AUTO: 10 FL (ref 6–12)
POTASSIUM SERPL-SCNC: 4.3 MMOL/L (ref 3.5–5.2)
PROT SERPL-MCNC: 8 G/DL (ref 6–8.5)
RBC # BLD AUTO: 5.2 10*6/MM3 (ref 4.14–5.8)
RBC # FLD AUTO: 2588 /MM3
SODIUM SERPL-SCNC: 135 MMOL/L (ref 136–145)
URATE SERPL-MCNC: 4.2 MG/DL (ref 3.4–7)
WBC NRBC COR # BLD AUTO: 8.33 10*3/MM3 (ref 3.4–10.8)

## 2024-12-05 PROCEDURE — 89060 EXAM SYNOVIAL FLUID CRYSTALS: CPT

## 2024-12-05 PROCEDURE — 86037 ANCA TITER EACH ANTIBODY: CPT

## 2024-12-05 PROCEDURE — 83516 IMMUNOASSAY NONANTIBODY: CPT

## 2024-12-05 PROCEDURE — 86255 FLUORESCENT ANTIBODY SCREEN: CPT

## 2024-12-05 PROCEDURE — 89051 BODY FLUID CELL COUNT: CPT

## 2024-12-05 PROCEDURE — 86038 ANTINUCLEAR ANTIBODIES: CPT

## 2024-12-05 PROCEDURE — 85025 COMPLETE CBC W/AUTO DIFF WBC: CPT

## 2024-12-05 PROCEDURE — 86140 C-REACTIVE PROTEIN: CPT

## 2024-12-05 PROCEDURE — 86431 RHEUMATOID FACTOR QUANT: CPT

## 2024-12-05 PROCEDURE — 85652 RBC SED RATE AUTOMATED: CPT

## 2024-12-05 PROCEDURE — 36415 COLL VENOUS BLD VENIPUNCTURE: CPT

## 2024-12-05 PROCEDURE — 87205 SMEAR GRAM STAIN: CPT

## 2024-12-05 PROCEDURE — 81374 HLA I TYPING 1 ANTIGEN LR: CPT

## 2024-12-05 PROCEDURE — 82550 ASSAY OF CK (CPK): CPT

## 2024-12-05 PROCEDURE — 80053 COMPREHEN METABOLIC PANEL: CPT

## 2024-12-05 PROCEDURE — 86200 CCP ANTIBODY: CPT

## 2024-12-05 PROCEDURE — 84550 ASSAY OF BLOOD/URIC ACID: CPT

## 2024-12-05 PROCEDURE — 87070 CULTURE OTHR SPECIMN AEROBIC: CPT

## 2024-12-06 LAB
ANA SER QL: NEGATIVE
CCP IGA+IGG SERPL IA-ACNC: 7 UNITS (ref 0–19)

## 2024-12-09 LAB
BACTERIA FLD CULT: NORMAL
C-ANCA TITR SER IF: NORMAL TITER
GRAM STN SPEC: NORMAL
GRAM STN SPEC: NORMAL
MYELOPEROXIDASE AB SER IA-ACNC: <0.2 UNITS (ref 0–0.9)
P-ANCA ATYPICAL TITR SER IF: NORMAL TITER
P-ANCA TITR SER IF: NORMAL TITER
PROTEINASE3 AB SER IA-ACNC: <0.2 UNITS (ref 0–0.9)

## 2024-12-10 LAB — DSDNA AB SER QL CLIF: NEGATIVE

## 2024-12-11 LAB — HLA-B27 QL NAA+PROBE: POSITIVE
